# Patient Record
Sex: FEMALE | Race: WHITE | NOT HISPANIC OR LATINO | Employment: OTHER | URBAN - METROPOLITAN AREA
[De-identification: names, ages, dates, MRNs, and addresses within clinical notes are randomized per-mention and may not be internally consistent; named-entity substitution may affect disease eponyms.]

---

## 2022-01-17 ENCOUNTER — APPOINTMENT (OUTPATIENT)
Dept: CARDIOLOGY | Facility: HOSPITAL | Age: 62
End: 2022-01-17

## 2022-01-17 ENCOUNTER — APPOINTMENT (OUTPATIENT)
Dept: CT IMAGING | Facility: HOSPITAL | Age: 62
End: 2022-01-17

## 2022-01-17 ENCOUNTER — HOSPITAL ENCOUNTER (INPATIENT)
Facility: HOSPITAL | Age: 62
LOS: 4 days | Discharge: HOME | End: 2022-01-21
Attending: EMERGENCY MEDICINE | Admitting: INTERNAL MEDICINE

## 2022-01-17 ENCOUNTER — APPOINTMENT (OUTPATIENT)
Dept: GENERAL RADIOLOGY | Facility: HOSPITAL | Age: 62
End: 2022-01-17

## 2022-01-17 DIAGNOSIS — J96.21 ACUTE ON CHRONIC RESPIRATORY FAILURE WITH HYPOXIA AND HYPERCAPNIA: Primary | ICD-10-CM

## 2022-01-17 DIAGNOSIS — A41.9 ACUTE SEPSIS: ICD-10-CM

## 2022-01-17 DIAGNOSIS — J96.22 ACUTE ON CHRONIC RESPIRATORY FAILURE WITH HYPOXIA AND HYPERCAPNIA: Primary | ICD-10-CM

## 2022-01-17 DIAGNOSIS — J44.1 ACUTE EXACERBATION OF CHRONIC OBSTRUCTIVE PULMONARY DISEASE (COPD): ICD-10-CM

## 2022-01-17 DIAGNOSIS — J18.9 PNEUMONIA DUE TO INFECTIOUS ORGANISM, UNSPECIFIED LATERALITY, UNSPECIFIED PART OF LUNG: ICD-10-CM

## 2022-01-17 PROBLEM — Z78.9 ALCOHOL USE: Status: ACTIVE | Noted: 2022-01-17

## 2022-01-17 PROBLEM — R74.8 ELEVATED ALKALINE PHOSPHATASE LEVEL: Status: ACTIVE | Noted: 2022-01-17

## 2022-01-17 PROBLEM — Z72.0 TOBACCO USE: Status: ACTIVE | Noted: 2022-01-17

## 2022-01-17 PROBLEM — F41.8 ANXIETY ASSOCIATED WITH DEPRESSION: Status: ACTIVE | Noted: 2022-01-17

## 2022-01-17 PROBLEM — N17.9 ACUTE KIDNEY INJURY: Status: ACTIVE | Noted: 2022-01-17

## 2022-01-17 PROBLEM — J96.02 ACUTE RESPIRATORY FAILURE WITH HYPOXIA AND HYPERCAPNIA: Status: ACTIVE | Noted: 2022-01-17

## 2022-01-17 PROBLEM — R79.89 ELEVATED SERUM CREATININE: Status: ACTIVE | Noted: 2022-01-17

## 2022-01-17 PROBLEM — J96.00 ACUTE RESPIRATORY FAILURE: Status: ACTIVE | Noted: 2022-01-17

## 2022-01-17 PROBLEM — E87.20 LACTIC ACIDOSIS: Status: ACTIVE | Noted: 2022-01-17

## 2022-01-17 PROBLEM — J96.01 ACUTE RESPIRATORY FAILURE WITH HYPOXIA AND HYPERCAPNIA: Status: ACTIVE | Noted: 2022-01-17

## 2022-01-17 PROBLEM — D72.829 LEUKOCYTOSIS: Status: ACTIVE | Noted: 2022-01-17

## 2022-01-17 PROBLEM — I16.0 HYPERTENSIVE URGENCY: Status: ACTIVE | Noted: 2022-01-17

## 2022-01-17 PROBLEM — E87.6 HYPOKALEMIA: Status: ACTIVE | Noted: 2022-01-17

## 2022-01-17 PROBLEM — R77.8 ELEVATED TROPONIN: Status: ACTIVE | Noted: 2022-01-17

## 2022-01-17 LAB
ALBUMIN SERPL-MCNC: 3.2 G/DL (ref 3.5–5.2)
ALBUMIN/GLOB SERPL: 0.9 G/DL
ALP SERPL-CCNC: 196 U/L (ref 39–117)
ALT SERPL W P-5'-P-CCNC: 19 U/L (ref 1–33)
AMPHET+METHAMPHET UR QL: NEGATIVE
AMPHETAMINES UR QL: NEGATIVE
ANION GAP SERPL CALCULATED.3IONS-SCNC: 15 MMOL/L (ref 5–15)
ARTERIAL PATENCY WRIST A: ABNORMAL
AST SERPL-CCNC: 36 U/L (ref 1–32)
ATMOSPHERIC PRESS: ABNORMAL MM[HG]
BACTERIA UR QL AUTO: ABNORMAL /HPF
BARBITURATES UR QL SCN: NEGATIVE
BASE EXCESS BLDA CALC-SCNC: 7.7 MMOL/L (ref 0–2)
BASOPHILS # BLD AUTO: 0.12 10*3/MM3 (ref 0–0.2)
BASOPHILS NFR BLD AUTO: 1 % (ref 0–1.5)
BDY SITE: ABNORMAL
BENZODIAZ UR QL SCN: NEGATIVE
BILIRUB SERPL-MCNC: 1 MG/DL (ref 0–1.2)
BILIRUB UR QL STRIP: NEGATIVE
BODY TEMPERATURE: 37 C
BUN SERPL-MCNC: 17 MG/DL (ref 8–23)
BUN/CREAT SERPL: 13.6 (ref 7–25)
BUPRENORPHINE SERPL-MCNC: NEGATIVE NG/ML
CALCIUM SPEC-SCNC: 8.5 MG/DL (ref 8.6–10.5)
CANNABINOIDS SERPL QL: NEGATIVE
CHLORIDE SERPL-SCNC: 93 MMOL/L (ref 98–107)
CLARITY UR: CLEAR
CO2 BLDA-SCNC: 36.1 MMOL/L (ref 22–33)
CO2 SERPL-SCNC: 31 MMOL/L (ref 22–29)
COCAINE UR QL: NEGATIVE
COHGB MFR BLD: 5.2 % (ref 0–2)
COLOR UR: YELLOW
CREAT SERPL-MCNC: 1.25 MG/DL (ref 0.57–1)
D-LACTATE SERPL-SCNC: 1.9 MMOL/L (ref 0.5–2)
D-LACTATE SERPL-SCNC: 2.5 MMOL/L (ref 0.5–2)
DEPRECATED RDW RBC AUTO: 77.7 FL (ref 37–54)
EOSINOPHIL # BLD AUTO: 0 10*3/MM3 (ref 0–0.4)
EOSINOPHIL NFR BLD AUTO: 0 % (ref 0.3–6.2)
EPAP: 8
ERYTHROCYTE [DISTWIDTH] IN BLOOD BY AUTOMATED COUNT: 15.6 % (ref 12.3–15.4)
ETHANOL BLD-MCNC: <10 MG/DL (ref 0–10)
FLUAV RNA RESP QL NAA+PROBE: NOT DETECTED
FLUBV RNA RESP QL NAA+PROBE: NOT DETECTED
GFR SERPL CREATININE-BSD FRML MDRD: 43 ML/MIN/1.73
GLOBULIN UR ELPH-MCNC: 3.7 GM/DL
GLUCOSE BLDC GLUCOMTR-MCNC: 169 MG/DL (ref 70–130)
GLUCOSE BLDC GLUCOMTR-MCNC: 174 MG/DL (ref 70–130)
GLUCOSE SERPL-MCNC: 125 MG/DL (ref 65–99)
GLUCOSE UR STRIP-MCNC: NEGATIVE MG/DL
HBA1C MFR BLD: 4.9 % (ref 4.8–5.6)
HCO3 BLDA-SCNC: 34.5 MMOL/L (ref 20–26)
HCT VFR BLD AUTO: 53.5 % (ref 34–46.6)
HCT VFR BLD CALC: 56.5 % (ref 38–51)
HGB BLD-MCNC: 18.6 G/DL (ref 12–15.9)
HGB BLDA-MCNC: 18.4 G/DL (ref 14–18)
HGB UR QL STRIP.AUTO: ABNORMAL
HOLD SPECIMEN: NORMAL
HYALINE CASTS UR QL AUTO: ABNORMAL /LPF
IMM GRANULOCYTES # BLD AUTO: 0.16 10*3/MM3 (ref 0–0.05)
IMM GRANULOCYTES NFR BLD AUTO: 1.3 % (ref 0–0.5)
INHALED O2 CONCENTRATION: 60 %
IPAP: 18
KETONES UR QL STRIP: NEGATIVE
LEUKOCYTE ESTERASE UR QL STRIP.AUTO: NEGATIVE
LYMPHOCYTES # BLD AUTO: 1.02 10*3/MM3 (ref 0.7–3.1)
LYMPHOCYTES NFR BLD AUTO: 8.3 % (ref 19.6–45.3)
MAGNESIUM SERPL-MCNC: 2.2 MG/DL (ref 1.6–2.4)
MCH RBC QN AUTO: 45.4 PG (ref 26.6–33)
MCHC RBC AUTO-ENTMCNC: 34.8 G/DL (ref 31.5–35.7)
MCV RBC AUTO: 130.5 FL (ref 79–97)
METHADONE UR QL SCN: NEGATIVE
METHGB BLD QL: 0.1 % (ref 0–1.5)
MODALITY: ABNORMAL
MONOCYTES # BLD AUTO: 1.08 10*3/MM3 (ref 0.1–0.9)
MONOCYTES NFR BLD AUTO: 8.8 % (ref 5–12)
NEUTROPHILS NFR BLD AUTO: 80.6 % (ref 42.7–76)
NEUTROPHILS NFR BLD AUTO: 9.86 10*3/MM3 (ref 1.7–7)
NITRITE UR QL STRIP: NEGATIVE
NOTE: ABNORMAL
NRBC BLD AUTO-RTO: 0.6 /100 WBC (ref 0–0.2)
NT-PROBNP SERPL-MCNC: ABNORMAL PG/ML (ref 0–900)
OPIATES UR QL: NEGATIVE
OXYCODONE UR QL SCN: NEGATIVE
OXYHGB MFR BLDV: 89.1 % (ref 94–99)
PCO2 BLDA: 53 MM HG (ref 35–45)
PCO2 TEMP ADJ BLD: 53 MM HG (ref 35–45)
PCP UR QL SCN: NEGATIVE
PH BLDA: 7.42 PH UNITS (ref 7.35–7.45)
PH UR STRIP.AUTO: 6.5 [PH] (ref 5–8)
PH, TEMP CORRECTED: 7.42 PH UNITS
PLATELET # BLD AUTO: 335 10*3/MM3 (ref 140–450)
PMV BLD AUTO: 9 FL (ref 6–12)
PO2 BLDA: 68.7 MM HG (ref 83–108)
PO2 TEMP ADJ BLD: 68.7 MM HG (ref 83–108)
POTASSIUM SERPL-SCNC: 3.4 MMOL/L (ref 3.5–5.2)
PROCALCITONIN SERPL-MCNC: 0.43 NG/ML (ref 0–0.25)
PROPOXYPH UR QL: NEGATIVE
PROT SERPL-MCNC: 6.9 G/DL (ref 6–8.5)
PROT UR QL STRIP: ABNORMAL
QT INTERVAL: 380 MS
QTC INTERVAL: 497 MS
RBC # BLD AUTO: 4.1 10*6/MM3 (ref 3.77–5.28)
RBC # UR STRIP: ABNORMAL /HPF
REF LAB TEST METHOD: ABNORMAL
RSV RNA NPH QL NAA+NON-PROBE: NOT DETECTED
SARS-COV-2 RNA RESP QL NAA+PROBE: NOT DETECTED
SET MECH RESP RATE: 10
SODIUM SERPL-SCNC: 139 MMOL/L (ref 136–145)
SP GR UR STRIP: 1.01 (ref 1–1.03)
SQUAMOUS #/AREA URNS HPF: ABNORMAL /HPF
TOTAL RATE: 22 BREATHS/MINUTE
TRICYCLICS UR QL SCN: NEGATIVE
TROPONIN T SERPL-MCNC: 0.03 NG/ML (ref 0–0.03)
TROPONIN T SERPL-MCNC: 0.05 NG/ML (ref 0–0.03)
TROPONIN T SERPL-MCNC: 0.08 NG/ML (ref 0–0.03)
UROBILINOGEN UR QL STRIP: ABNORMAL
VENTILATOR MODE: ABNORMAL
WBC # UR STRIP: ABNORMAL /HPF
WBC NRBC COR # BLD: 12.24 10*3/MM3 (ref 3.4–10.8)
WHOLE BLOOD HOLD SPECIMEN: NORMAL
WHOLE BLOOD HOLD SPECIMEN: NORMAL

## 2022-01-17 PROCEDURE — 94640 AIRWAY INHALATION TREATMENT: CPT

## 2022-01-17 PROCEDURE — 71275 CT ANGIOGRAPHY CHEST: CPT

## 2022-01-17 PROCEDURE — 84484 ASSAY OF TROPONIN QUANT: CPT | Performed by: HOSPITALIST

## 2022-01-17 PROCEDURE — 81001 URINALYSIS AUTO W/SCOPE: CPT | Performed by: NURSE PRACTITIONER

## 2022-01-17 PROCEDURE — 36600 WITHDRAWAL OF ARTERIAL BLOOD: CPT

## 2022-01-17 PROCEDURE — 85025 COMPLETE CBC W/AUTO DIFF WBC: CPT | Performed by: EMERGENCY MEDICINE

## 2022-01-17 PROCEDURE — 94799 UNLISTED PULMONARY SVC/PX: CPT

## 2022-01-17 PROCEDURE — 25010000002 PIPERACILLIN SOD-TAZOBACTAM PER 1 G: Performed by: INTERNAL MEDICINE

## 2022-01-17 PROCEDURE — 25010000002 METHYLPREDNISOLONE PER 40 MG: Performed by: NURSE PRACTITIONER

## 2022-01-17 PROCEDURE — 99223 1ST HOSP IP/OBS HIGH 75: CPT | Performed by: INTERNAL MEDICINE

## 2022-01-17 PROCEDURE — 99285 EMERGENCY DEPT VISIT HI MDM: CPT

## 2022-01-17 PROCEDURE — 84484 ASSAY OF TROPONIN QUANT: CPT | Performed by: NURSE PRACTITIONER

## 2022-01-17 PROCEDURE — 84484 ASSAY OF TROPONIN QUANT: CPT | Performed by: EMERGENCY MEDICINE

## 2022-01-17 PROCEDURE — 83605 ASSAY OF LACTIC ACID: CPT | Performed by: EMERGENCY MEDICINE

## 2022-01-17 PROCEDURE — 87637 SARSCOV2&INF A&B&RSV AMP PRB: CPT | Performed by: EMERGENCY MEDICINE

## 2022-01-17 PROCEDURE — 25010000002 FUROSEMIDE PER 20 MG: Performed by: HOSPITALIST

## 2022-01-17 PROCEDURE — 83050 HGB METHEMOGLOBIN QUAN: CPT

## 2022-01-17 PROCEDURE — 80306 DRUG TEST PRSMV INSTRMNT: CPT | Performed by: NURSE PRACTITIONER

## 2022-01-17 PROCEDURE — 71045 X-RAY EXAM CHEST 1 VIEW: CPT

## 2022-01-17 PROCEDURE — 82962 GLUCOSE BLOOD TEST: CPT

## 2022-01-17 PROCEDURE — 82077 ASSAY SPEC XCP UR&BREATH IA: CPT | Performed by: NURSE PRACTITIONER

## 2022-01-17 PROCEDURE — 25010000002 HEPARIN (PORCINE) PER 1000 UNITS: Performed by: NURSE PRACTITIONER

## 2022-01-17 PROCEDURE — 87040 BLOOD CULTURE FOR BACTERIA: CPT | Performed by: EMERGENCY MEDICINE

## 2022-01-17 PROCEDURE — 25010000002 DEXAMETHASONE SODIUM PHOSPHATE 100 MG/10ML SOLUTION: Performed by: EMERGENCY MEDICINE

## 2022-01-17 PROCEDURE — 94761 N-INVAS EAR/PLS OXIMETRY MLT: CPT

## 2022-01-17 PROCEDURE — 83036 HEMOGLOBIN GLYCOSYLATED A1C: CPT | Performed by: NURSE PRACTITIONER

## 2022-01-17 PROCEDURE — 0 IOPAMIDOL PER 1 ML: Performed by: HOSPITALIST

## 2022-01-17 PROCEDURE — 93306 TTE W/DOPPLER COMPLETE: CPT

## 2022-01-17 PROCEDURE — 94660 CPAP INITIATION&MGMT: CPT

## 2022-01-17 PROCEDURE — 70450 CT HEAD/BRAIN W/O DYE: CPT

## 2022-01-17 PROCEDURE — 83735 ASSAY OF MAGNESIUM: CPT | Performed by: NURSE PRACTITIONER

## 2022-01-17 PROCEDURE — 84145 PROCALCITONIN (PCT): CPT | Performed by: NURSE PRACTITIONER

## 2022-01-17 PROCEDURE — 82375 ASSAY CARBOXYHB QUANT: CPT

## 2022-01-17 PROCEDURE — 93005 ELECTROCARDIOGRAM TRACING: CPT | Performed by: EMERGENCY MEDICINE

## 2022-01-17 PROCEDURE — 83880 ASSAY OF NATRIURETIC PEPTIDE: CPT | Performed by: EMERGENCY MEDICINE

## 2022-01-17 PROCEDURE — 25010000002 AZITHROMYCIN PER 500 MG: Performed by: EMERGENCY MEDICINE

## 2022-01-17 PROCEDURE — 80053 COMPREHEN METABOLIC PANEL: CPT | Performed by: EMERGENCY MEDICINE

## 2022-01-17 PROCEDURE — 82805 BLOOD GASES W/O2 SATURATION: CPT

## 2022-01-17 PROCEDURE — 87636 SARSCOV2 & INF A&B AMP PRB: CPT | Performed by: EMERGENCY MEDICINE

## 2022-01-17 PROCEDURE — 93005 ELECTROCARDIOGRAM TRACING: CPT

## 2022-01-17 RX ORDER — DIPHENOXYLATE HYDROCHLORIDE AND ATROPINE SULFATE 2.5; .025 MG/1; MG/1
1 TABLET ORAL DAILY
Status: COMPLETED | OUTPATIENT
Start: 2022-01-18 | End: 2022-01-20

## 2022-01-17 RX ORDER — ACETAMINOPHEN 325 MG/1
650 TABLET ORAL EVERY 6 HOURS PRN
Status: DISCONTINUED | OUTPATIENT
Start: 2022-01-17 | End: 2022-01-21 | Stop reason: HOSPADM

## 2022-01-17 RX ORDER — AMLODIPINE BESYLATE AND BENAZEPRIL HYDROCHLORIDE 5; 10 MG/1; MG/1
1 CAPSULE ORAL DAILY
COMMUNITY
End: 2022-01-21 | Stop reason: HOSPADM

## 2022-01-17 RX ORDER — LORAZEPAM 1 MG/1
2 TABLET ORAL
Status: DISCONTINUED | OUTPATIENT
Start: 2022-01-17 | End: 2022-01-21 | Stop reason: HOSPADM

## 2022-01-17 RX ORDER — IPRATROPIUM BROMIDE AND ALBUTEROL SULFATE 2.5; .5 MG/3ML; MG/3ML
3 SOLUTION RESPIRATORY (INHALATION)
Status: DISCONTINUED | OUTPATIENT
Start: 2022-01-17 | End: 2022-01-21 | Stop reason: HOSPADM

## 2022-01-17 RX ORDER — IPRATROPIUM BROMIDE AND ALBUTEROL SULFATE 2.5; .5 MG/3ML; MG/3ML
3 SOLUTION RESPIRATORY (INHALATION) EVERY 4 HOURS PRN
Status: DISCONTINUED | OUTPATIENT
Start: 2022-01-17 | End: 2022-01-21 | Stop reason: HOSPADM

## 2022-01-17 RX ORDER — LORAZEPAM 1 MG/1
1 TABLET ORAL
Status: DISCONTINUED | OUTPATIENT
Start: 2022-01-17 | End: 2022-01-21 | Stop reason: HOSPADM

## 2022-01-17 RX ORDER — SODIUM CHLORIDE 0.9 % (FLUSH) 0.9 %
10 SYRINGE (ML) INJECTION AS NEEDED
Status: DISCONTINUED | OUTPATIENT
Start: 2022-01-17 | End: 2022-01-21 | Stop reason: HOSPADM

## 2022-01-17 RX ORDER — LORAZEPAM 2 MG/ML
1 INJECTION INTRAMUSCULAR
Status: DISCONTINUED | OUTPATIENT
Start: 2022-01-17 | End: 2022-01-21 | Stop reason: HOSPADM

## 2022-01-17 RX ORDER — DULOXETIN HYDROCHLORIDE 60 MG/1
60 CAPSULE, DELAYED RELEASE ORAL DAILY
COMMUNITY

## 2022-01-17 RX ORDER — MONTELUKAST SODIUM 10 MG/1
10 TABLET ORAL NIGHTLY
COMMUNITY

## 2022-01-17 RX ORDER — TRIAMTERENE AND HYDROCHLOROTHIAZIDE 37.5; 25 MG/1; MG/1
1 CAPSULE ORAL EVERY MORNING
COMMUNITY

## 2022-01-17 RX ORDER — DULOXETIN HYDROCHLORIDE 60 MG/1
60 CAPSULE, DELAYED RELEASE ORAL DAILY
Status: DISCONTINUED | OUTPATIENT
Start: 2022-01-17 | End: 2022-01-21 | Stop reason: HOSPADM

## 2022-01-17 RX ORDER — LORAZEPAM 2 MG/ML
2 INJECTION INTRAMUSCULAR
Status: DISCONTINUED | OUTPATIENT
Start: 2022-01-17 | End: 2022-01-21 | Stop reason: HOSPADM

## 2022-01-17 RX ORDER — NITROGLYCERIN 20 MG/100ML
10-50 INJECTION INTRAVENOUS
Status: DISCONTINUED | OUTPATIENT
Start: 2022-01-17 | End: 2022-01-17

## 2022-01-17 RX ORDER — POTASSIUM CHLORIDE 750 MG/1
40 CAPSULE, EXTENDED RELEASE ORAL ONCE
Status: COMPLETED | OUTPATIENT
Start: 2022-01-17 | End: 2022-01-17

## 2022-01-17 RX ORDER — FOLIC ACID 1 MG/1
1 TABLET ORAL DAILY
Status: COMPLETED | OUTPATIENT
Start: 2022-01-18 | End: 2022-01-20

## 2022-01-17 RX ORDER — AMLODIPINE BESYLATE 5 MG/1
5 TABLET ORAL
Status: DISCONTINUED | OUTPATIENT
Start: 2022-01-17 | End: 2022-01-18

## 2022-01-17 RX ORDER — FOLIC ACID 1 MG/1
1 TABLET ORAL DAILY
Status: DISCONTINUED | OUTPATIENT
Start: 2022-01-18 | End: 2022-01-17

## 2022-01-17 RX ORDER — METHYLPREDNISOLONE SODIUM SUCCINATE 40 MG/ML
40 INJECTION, POWDER, LYOPHILIZED, FOR SOLUTION INTRAMUSCULAR; INTRAVENOUS EVERY 8 HOURS
Status: DISCONTINUED | OUTPATIENT
Start: 2022-01-17 | End: 2022-01-18

## 2022-01-17 RX ORDER — DIPHENOXYLATE HYDROCHLORIDE AND ATROPINE SULFATE 2.5; .025 MG/1; MG/1
1 TABLET ORAL DAILY
Status: DISCONTINUED | OUTPATIENT
Start: 2022-01-18 | End: 2022-01-17

## 2022-01-17 RX ORDER — MONTELUKAST SODIUM 10 MG/1
10 TABLET ORAL NIGHTLY
Status: DISCONTINUED | OUTPATIENT
Start: 2022-01-17 | End: 2022-01-21 | Stop reason: HOSPADM

## 2022-01-17 RX ORDER — NITROGLYCERIN 20 MG/100ML
5-200 INJECTION INTRAVENOUS
Status: DISCONTINUED | OUTPATIENT
Start: 2022-01-17 | End: 2022-01-19

## 2022-01-17 RX ORDER — SODIUM CHLORIDE 0.9 % (FLUSH) 0.9 %
10 SYRINGE (ML) INJECTION EVERY 12 HOURS SCHEDULED
Status: DISCONTINUED | OUTPATIENT
Start: 2022-01-17 | End: 2022-01-21 | Stop reason: HOSPADM

## 2022-01-17 RX ORDER — METHYLPREDNISOLONE SODIUM SUCCINATE 40 MG/ML
40 INJECTION, POWDER, LYOPHILIZED, FOR SOLUTION INTRAMUSCULAR; INTRAVENOUS EVERY 8 HOURS
Status: DISCONTINUED | OUTPATIENT
Start: 2022-01-17 | End: 2022-01-17

## 2022-01-17 RX ORDER — FUROSEMIDE 10 MG/ML
40 INJECTION INTRAMUSCULAR; INTRAVENOUS ONCE
Status: COMPLETED | OUTPATIENT
Start: 2022-01-17 | End: 2022-01-17

## 2022-01-17 RX ORDER — HEPARIN SODIUM 5000 [USP'U]/ML
5000 INJECTION, SOLUTION INTRAVENOUS; SUBCUTANEOUS EVERY 8 HOURS SCHEDULED
Status: DISCONTINUED | OUTPATIENT
Start: 2022-01-17 | End: 2022-01-21 | Stop reason: HOSPADM

## 2022-01-17 RX ADMIN — NITROGLYCERIN 10 MCG/MIN: 20 INJECTION INTRAVENOUS at 10:50

## 2022-01-17 RX ADMIN — TAZOBACTAM SODIUM AND PIPERACILLIN SODIUM 3.38 G: 375; 3 INJECTION, SOLUTION INTRAVENOUS at 14:06

## 2022-01-17 RX ADMIN — DULOXETINE 60 MG: 60 CAPSULE, DELAYED RELEASE ORAL at 11:28

## 2022-01-17 RX ADMIN — IPRATROPIUM BROMIDE AND ALBUTEROL SULFATE 3 ML: .5; 2.5 SOLUTION RESPIRATORY (INHALATION) at 19:01

## 2022-01-17 RX ADMIN — IOPAMIDOL 75 ML: 755 INJECTION, SOLUTION INTRAVENOUS at 09:16

## 2022-01-17 RX ADMIN — METHYLPREDNISOLONE SODIUM SUCCINATE 40 MG: 40 INJECTION, POWDER, FOR SOLUTION INTRAMUSCULAR; INTRAVENOUS at 10:49

## 2022-01-17 RX ADMIN — METHYLPREDNISOLONE SODIUM SUCCINATE 40 MG: 40 INJECTION, POWDER, FOR SOLUTION INTRAMUSCULAR; INTRAVENOUS at 20:25

## 2022-01-17 RX ADMIN — POTASSIUM CHLORIDE 40 MEQ: 10 CAPSULE, COATED, EXTENDED RELEASE ORAL at 10:49

## 2022-01-17 RX ADMIN — MONTELUKAST SODIUM 10 MG: 10 TABLET, COATED ORAL at 20:25

## 2022-01-17 RX ADMIN — DOXYCYCLINE 100 MG: 100 INJECTION, POWDER, LYOPHILIZED, FOR SOLUTION INTRAVENOUS at 12:18

## 2022-01-17 RX ADMIN — NITROGLYCERIN 90 MCG/MIN: 20 INJECTION INTRAVENOUS at 23:54

## 2022-01-17 RX ADMIN — FUROSEMIDE 40 MG: 10 INJECTION, SOLUTION INTRAMUSCULAR; INTRAVENOUS at 08:27

## 2022-01-17 RX ADMIN — BREXPIPRAZOLE 1 MG: 1 TABLET ORAL at 11:29

## 2022-01-17 RX ADMIN — IPRATROPIUM BROMIDE AND ALBUTEROL SULFATE 3 ML: .5; 2.5 SOLUTION RESPIRATORY (INHALATION) at 15:57

## 2022-01-17 RX ADMIN — THIAMINE HCL TAB 100 MG 100 MG: 100 TAB at 10:55

## 2022-01-17 RX ADMIN — SODIUM CHLORIDE, PRESERVATIVE FREE 10 ML: 5 INJECTION INTRAVENOUS at 10:55

## 2022-01-17 RX ADMIN — SODIUM CHLORIDE, PRESERVATIVE FREE 10 ML: 5 INJECTION INTRAVENOUS at 20:25

## 2022-01-17 RX ADMIN — DEXAMETHASONE SODIUM PHOSPHATE 10 MG: 10 INJECTION, SOLUTION INTRAMUSCULAR; INTRAVENOUS at 05:08

## 2022-01-17 RX ADMIN — TAZOBACTAM SODIUM AND PIPERACILLIN SODIUM 3.38 G: 375; 3 INJECTION, SOLUTION INTRAVENOUS at 21:11

## 2022-01-17 RX ADMIN — AZITHROMYCIN 500 MG: 500 INJECTION, POWDER, LYOPHILIZED, FOR SOLUTION INTRAVENOUS at 05:34

## 2022-01-17 RX ADMIN — AMLODIPINE BESYLATE 5 MG: 5 TABLET ORAL at 08:29

## 2022-01-17 RX ADMIN — TAZOBACTAM SODIUM AND PIPERACILLIN SODIUM 4.5 G: 500; 4 INJECTION, SOLUTION INTRAVENOUS at 08:10

## 2022-01-17 RX ADMIN — IPRATROPIUM BROMIDE AND ALBUTEROL SULFATE 3 ML: .5; 2.5 SOLUTION RESPIRATORY (INHALATION) at 07:13

## 2022-01-17 RX ADMIN — NITROGLYCERIN 50 MCG/MIN: 20 INJECTION INTRAVENOUS at 04:41

## 2022-01-17 RX ADMIN — HEPARIN SODIUM 5000 UNITS: 5000 INJECTION, SOLUTION INTRAVENOUS; SUBCUTANEOUS at 14:06

## 2022-01-17 RX ADMIN — DOXYCYCLINE 100 MG: 100 INJECTION, POWDER, LYOPHILIZED, FOR SOLUTION INTRAVENOUS at 23:55

## 2022-01-17 RX ADMIN — HEPARIN SODIUM 5000 UNITS: 5000 INJECTION, SOLUTION INTRAVENOUS; SUBCUTANEOUS at 20:25

## 2022-01-17 RX ADMIN — ACETAMINOPHEN 650 MG: 325 TABLET, FILM COATED ORAL at 22:20

## 2022-01-17 NOTE — PROGRESS NOTES
[facility]  ADMISSION FOLLOW-UP NOTE          Patient admitted after midnight, H&P by my partner performed earlier on today's date reviewed.  Interim findings, labs, and charting also reviewed.        The [facility] Hospital Problem List has been managed and updated to include any new diagnoses:  Active Hospital Problems    Diagnosis  POA   • **COPD with acute exacerbation (HCC) [J44.1]  Yes   • Lactic acidosis [E87.2]  Yes   • Elevated troponin [R77.8]  Yes   • Hypokalemia [E87.6]  Yes   • Elevated serum creatinine [R79.89]  Yes   • Leukocytosis [D72.829]  Yes   • Hypertensive urgency [I16.0]  Yes   • Acute respiratory failure (HCC) [J96.00]  Yes   • Alcohol use [Z72.89]  Yes   • Tobacco use [Z72.0]  Yes   • Anxiety associated with depression [F41.8]  Yes   • Acute respiratory failure with hypoxia and hypercapnia (HCC) [J96.01, J96.02]  Yes   • Sepsis (HCC) [A41.9]  Yes      Resolved Hospital Problems   No resolved problems to display.     62 yo F with hx of COPD, ongoing tobacco abuse, HTN, and ETOH use who presents due to respiratory distress. EMS found initial O2 sats to be 50% and patient was very somnolent. Brought to the ER and placed on BiPAP. /112 on arrival. Labs showed troponin 0.032, proBNP 12,613, K 3.4, Cr 1.25, lactic acid 2.5, procal 0.43, WBC 12.24. ABG was not collected until after she had been on BiPAP for a few hours--pH 7.42, pCO2 52. CXR showed mild cardiomegaly only. COVID/Flu/RSV PCR negative.    Acute hypoxic/hypercarbic respiratory failure  COPD exacerbation  Possible acute diastolic CHF  Sepsis, POA due to possible aspiration? (tachycardia, leukocytosis, lactic acidosis, elevated procal)  --CXR no acute findings. CTA chest with moderate to severe emphysema, dependent bibasilar atelectasis, no evidence of PE.  --Weaned off BiPAP to 6 liters. Continue to wean as tolerated. Does not wear home oxygen, but says she has been told in the past that  "she needs it.  --Scheduled nebs, IV Solumedrol.  --Continue Zosyn/Doxycline for now. F/U blood cultures. UA negative.  --proBNP significantly elevated--will check Echo. Give IV Lasix 40 mg x 1. Defer fluids at this time.     Altered mental status, resolved  --Likely hypoxic/hypercarbic encephalopathy.  --CT head no acute findings.    Elevated troponin  --EKG with sinus tachycardia and some T wave inversions.  --Will trend. Likely demand ischemia in setting of severe hypoxia.    ETOH use  --Has 6 drinks daily, usually vodka + V8 juice.   --CIWA protocol and PRN Ativan.     Elevated Cr  --No labs for comparison.  --Watch after diuresis.    Hypertensive urgency  --On Amlodipine-Benazepril and Triamterene-HCTZ at home: resume Amlodipine only for now given elevated Cr.  --Nitro drip for now. Wean as tolerated.     Mood disorder  --Continue Cymbalta.    Ongoing tobacco abuse  --Patient \"smokes way too much\" and is embarrassed to tell me how much.   --Has allergy to nicotine patch.  --Counseled on tobacco cessation--she knows she needs to quit and is interested in starting treatment. Will offer resources closer to discharge.         Marilyn Mcdaniel MD  01/17/22      "

## 2022-01-17 NOTE — H&P
[facility] Medicine Services  HISTORY AND PHYSICAL    Patient Name: Arlene Pandey  : 1960  MRN: [id number]  Primary Care Physician: Poncho Garcia MD  Date of admission: 2022      Subjective   Subjective     Chief Complaint:  sob/history obtained from ER chart/ on phone    HPI:  Arlene Pandey is a 61 y.o. female  to ED secondary to respiratory distress.  As per  patient not feeling well over the past 24 hours, slept throughout the afternoon, around 3 PM has been noted wife struggling to breathe, called EMS, patient was found to be oxygen sats of 50% on initial evaluation by EMS, and very somnolent.  Patient was placed on BiPAP- during the ride, patient was more awake on initial ED evaluation.  As per  patient has chronic cough not much worse from the baseline, also chronic wheezing, supposed to use oxygen but never received it.  Still active smoker.  No complaint of fever or chills as per .  Has been doing well until 24 hours ago.  Has been fully vaccinated for COVID.  Patient is on BiPAP able to give limited history but denies any complaint of chest pain, abdominal pain, headaches.  Patient systolic blood pressure was found to be in 180s.  No complaint of pedal edema.  Review of system is limited-since patient is on BiPAP.        Current COVID Risks are:  [] Fever []  Cough [] Shortness of breath [] Fatigue [] Change in taste or smell    [] Exposure to COVID positive patient  [] High risk facility   []  NONE  COVID VACCINE status   -as per  is fully vaccinated including the booster dose.        Review of Systems   Complete ROS done, which is negative except as above and HPI.  Old records reviewed and summarized in PM hx      Personal History     Past Medical History:   Diagnosis Date   • Chronic respiratory failure (HCC)    • COPD (chronic obstructive pulmonary disease) (HCC)    COPD  Active smoker  HTN  Daily alcohol use  Mood  disorder       past surgical history   - is unaware about any major surgeries.    Family History:   - Multiple family member with history of hypertension..     Social History:    -Active smoker smokes multiple packs a day.  - Drinks white liquor 8 to 10 ounces every day, no known history of alcohol withdrawal or seizure.    Medications:  Available home medication information reviewed.  (Not in a hospital admission)      Allergies   Allergen Reactions   • Penicillins Rash       Objective   Objective     Vital Signs:   Temp:  [97.7 °F (36.5 °C)] 97.7 °F (36.5 °C)  Heart Rate:  [102-104] 102  Resp:  [19] 19-saturating 91% on 60% BiPAP  BP: (145-180)/() 152/119        Physical Exam     GENERAL-well nourished, uncomfortable due to BiPAP  RS-coarse expiratory crackles anteriorly with prolonged expiration,  CVS- s1s2 Regular, tachycardic, no murmur.  ABD- soft, nontender, not distended, no organomegaly.  EXT- no edema, skin appears dry and erythematous, nontender,.  NEURO-patient currently on BiPAP, is alert awake, follows verbal command EYES- Conjunctivae are normal. Pupils are equal, round, and reactive to light. No scleral icterus.   ENT- no external ear nose lesions, NECK- No JVD present. No tracheal deviation present. No thyromegaly present,No cervical lymphadenopathy.  JOINTS/MSK- no deformity, no swelling.  SKIN- no rash , warm to touch.  PSYCHIATRIC-anxious.    Results Reviewed:  I have personally reviewed current lab and radiology data.    Results from last 7 days   Lab Units 01/17/22  0441   WBC 10*3/mm3 12.24*   HEMOGLOBIN g/dL 18.6*   HEMATOCRIT % 53.5*   PLATELETS 10*3/mm3 335     Results from last 7 days   Lab Units 01/17/22  0441 01/17/22  0440   SODIUM mmol/L 139  --    POTASSIUM mmol/L 3.4*  --    CHLORIDE mmol/L 93*  --    CO2 mmol/L 31.0*  --    BUN mg/dL 17  --    CREATININE mg/dL 1.25*  --    GLUCOSE mg/dL 125*  --    CALCIUM mg/dL 8.5*  --    ALT (SGPT) U/L 19  --    AST (SGOT) U/L 36*   --    TROPONIN T ng/mL 0.032*  --    PROBNP pg/mL 12,613.0*  --    LACTATE mmol/L  --  2.5*     Estimated Creatinine Clearance: 46.9 mL/min (A) (by C-G formula based on SCr of 1.25 mg/dL (H)).  Brief Urine Lab Results     None        Imaging Results (Last 24 Hours)     Procedure Component Value Units Date/Time    XR Chest 1 View [[id number]] Collected: 01/17/22 0508     Updated: 01/17/22 0511    Narrative:      CR Chest 1 Vw    INDICATION:   Shortness of air.     COMPARISON:    None available.    FINDINGS:  Portable AP view(s) of the chest.  Mild cardiomegaly. Mediastinal contours are normal. The lungs are clear. No pneumothorax or pleural effusion.      Impression:      Mild cardiomegaly. No other acute chest findings.    Signer Name: Cristino Khan MD   Signed: 1/17/2022 5:08 AM   Workstation Name: [email or web address]    [facility]                Assessment/Plan   Active Hospital Problems    Diagnosis  POA   • COPD with acute exacerbation (HCC) [J44.1]  Yes   • Acute on chronic respiratory failure with hypoxia and hypercapnia (HCC) [J96.21, J96.22]  Yes   • Lactic acidosis [E87.2]  Yes   • Elevated troponin [R77.8]  Yes   • Hypokalemia [E87.6]  Yes   • Acute kidney injury (HCC) [N17.9]  Yes   • Elevated alkaline phosphatase level [R74.8]  Yes   • Leukocytosis [D72.829]  Yes   • Hypertensive urgency [I16.0]  Yes       Assessment & Plan     Altered mental status-metabolic encephalopathy secondary to hypercarbia,?  Alcohol induced.  Hypoxic/with chest x-ray-positive for mild cardiomegaly- possibility of aspiration.  - Continue BiPAP, repeat blood gas in an hour  - Zosyn/doxycycline  - DuoNebs, low-dose steroids    Hypertensive urgency-started on nitro drip we we will taper with administration of home meds.    Elevated troponin with proBNP-bedside ultrasound by ED attending, did not show pulmonary edema/on low EF   -We will get echo in a.m.  - EKG- sinus tach 103 bpm, right atrial enlargement, left  anterior fascicular block,  T wave inversion in 1 and aVL.    Elevated creatinine-baseline not known, no known function of renal dysfunction,-we will continue to monitor, IV fluids not started due to some worry of CHF component.    COPD-as above    Active smoker-as per  patient is allergic to nicotine gum/patch which gives her blisters.    HTN-at home is on Norvasc 5 mg, triamterene/HCTZ 37.5-25 mg in a.m.    Daily alcohol use- we will continue to monitor for any withdrawal    Mood disorder-Cymbalta 60 mg daily    DVT prophylaxis:    -TEDs/SCDs  -Lovenox    CODE STATUS:    Code Status and Medical Interventions:   Ordered at: 01/17/22 0555     Code Status (Patient has no pulse and is not breathing):    CPR (Attempt to Resuscitate)     Medical Interventions (Patient has pulse or is breathing):    Full Support         Admission Status:  I believe this patient meets inpatient criteria secondary to hypoxic respiratory failure, needing further management and diagnostic work-up.

## 2022-01-17 NOTE — ED PROVIDER NOTES
[place]    EMERGENCY DEPARTMENT ENCOUNTER      Pt Name: Arlene Pandey  MRN: [id number]  YOB: 1960  Date of evaluation: 1/17/2022  Provider: Alessandro Horn MD    CHIEF COMPLAINT       Chief Complaint   Patient presents with   • Shortness of Breath         HISTORY OF PRESENT ILLNESS  (Location/Symptom, Timing/Onset, Context/Setting, Quality, Duration, Modifying Factors, Severity.)   Arlene Pandey is a 61 y.o. female who presents to the emergency department with altered mental status and shortness of breath.  Per report from EMS, they were called because the patient's  said that she was very somnolent after going down for a nap around 2 PM yesterday afternoon and was having difficulty breathing.  They stated that on their arrival, the patient was very somnolent and saturating in the mid 50s on room air.  They applied CPAP and gave nebulizer treatments in route to the hospital.  They also noted that her blood pressure is markedly elevated and so they administered 2 doses of sublingual nitroglycerin.  She had rapid improvement in her mental status with these interventions and she is awake and alert on arrival.  She is not complaining about any chest pain.  She does state that she has had some dry cough over the course of the past couple of days but no fever.  She has a history of COPD and is supposed to wear oxygen at home but admits that she does not wear it.  She denies any cardiac history.      Nursing notes were reviewed.    REVIEW OF SYSTEMS    (2-9 systems for level 4, 10 or more for level 5)   ROS:  General:  No fevers, no chills, no weakness  Cardiovascular:  No chest pain, no palpitations  Respiratory: Shortness of breath, cough  Gastrointestinal:  No pain, no nausea, no vomiting, no diarrhea  Musculoskeletal:  No muscle pain, no joint pain  Skin:  No rash  Neurologic:  No speech problems, no headache, no extremity numbness, no extremity tingling, no extremity weakness  Psychiatric:  No  "anxiety  Genitourinary:  No dysuria, no hematuria    Except as noted above the remainder of the review of systems was reviewed and negative.       PAST MEDICAL HISTORY     Past Medical History:   Diagnosis Date   • Chronic respiratory failure (HCC)    • COPD (chronic obstructive pulmonary disease) (HCC)          SURGICAL HISTORY     No past surgical history on file.      CURRENT MEDICATIONS       Current Facility-Administered Medications:   •  AZITHROMYCIN 500 MG/250 ML 0.9% NS IVPB (vial-mate), 500 mg, Intravenous, Once, Alessandro Horn MD, 500 mg at 01/17/22 0534  •  cefTRIAXone (ROCEPHIN) 1 g/100 mL 0.9% NS (MBP), 1 g, Intravenous, Once, Alessandro Horn MD  •  nitroglycerin (TRIDIL) 200 mcg/ml infusion, 10-50 mcg/min, Intravenous, Titrated, Alessandro Horn MD, Stopped at 01/17/22 0505  •  sodium chloride 0.9 % flush 10 mL, 10 mL, Intravenous, PRN, Alessandro Horn MD  No current outpatient medications on file.    ALLERGIES     Penicillins    FAMILY HISTORY     No family history on file.       SOCIAL HISTORY       Social History     Socioeconomic History   • Marital status:          PHYSICAL EXAM    (up to 7 for level 4, 8 or more for level 5)     Vitals:    01/17/22 0450 01/17/22 0455 01/17/22 0500 01/17/22 0530   BP:  149/85 145/85 152/90   Pulse: 102      Resp: 19      Temp: 97.7 °F (36.5 °C)      TempSrc: Axillary      SpO2: 94% 93% 93% 91%   Weight: 90.7 kg (200 lb)      Height: 152.4 cm (60\")          Physical Exam  General: Awake, alert, severe distress, ill-appearing  HEENT: Conjunctivae normal.  Neck: Trachea midline.  Cardiac: Tachycardic, regular rhythm, no murmurs, rubs, or gallops  Lungs: Diffusely coarse with diffuse expiratory wheezes, no focal findings  Chest wall: There is no tenderness to palpation over the chest wall or over ribs  Abdomen: Abdomen is soft, nontender, nondistended. There are no firm or pulsatile masses, no rebound rigidity or guarding.   Musculoskeletal: No " deformity.  Neuro: Alert and oriented x 4.  Dermatology: Skin is warm and dry  Psych: Mentation is grossly normal, cognition is grossly normal. Affect is appropriate.        DIAGNOSTIC RESULTS     EKG: All EKGs are interpreted by the Emergency Department Physician who either signs or Co-signs this chart in the absence of a cardiologist.    ECG 12 Lead   Final Result   Test Reason : SHORTNESS OF BREATH   Blood Pressure :   */*   mmHG   Vent. Rate : 103 BPM     Atrial Rate : 103 BPM      P-R Int : 142 ms          QRS Dur :  96 ms       QT Int : 380 ms       P-R-T Axes :  75 -51 111 degrees      QTc Int : 497 ms      Sinus tachycardia with occasional premature ventricular complexes   Right atrial enlargement   Pulmonary disease pattern   Left anterior fascicular block   Abnormal ECG   No previous ECGs available   Confirmed by BRANDAN BLACKMON ([id number]) on 1/17/2022 5:55:18 AM      Referred By: EDMD           Confirmed By: BRANDAN BLACKMON          RADIOLOGY:   Non-plain film images such as CT, Ultrasound and MRI are read by the radiologist. Plain radiographic images are visualized and preliminarily interpreted by the emergency physician with the below findings:      [x] Radiologist's Report Reviewed:  XR Chest 1 View   Final Result   Mild cardiomegaly. No other acute chest findings.      Signer Name: Cristino Khan MD    Signed: 1/17/2022 5:08 AM    Workstation Name: [email or web address]     [facility]            ED BEDSIDE ULTRASOUND:   Performed by ED Physician - none    LABS:    I have reviewed and interpreted all of the currently available lab results from this visit (if applicable):  Results for orders placed or performed during the hospital encounter of 01/17/22   Comprehensive Metabolic Panel    Specimen: Blood   Result Value Ref Range    Glucose 125 (H) 65 - 99 mg/dL    BUN 17 8 - 23 mg/dL    Creatinine 1.25 (H) 0.57 - 1.00 mg/dL    Sodium 139 136 - 145 mmol/L    Potassium 3.4 (L) 3.5 - 5.2 mmol/L     Chloride 93 (L) 98 - 107 mmol/L    CO2 31.0 (H) 22.0 - 29.0 mmol/L    Calcium 8.5 (L) 8.6 - 10.5 mg/dL    Total Protein 6.9 6.0 - 8.5 g/dL    Albumin 3.20 (L) 3.50 - 5.20 g/dL    ALT (SGPT) 19 1 - 33 U/L    AST (SGOT) 36 (H) 1 - 32 U/L    Alkaline Phosphatase 196 (H) 39 - 117 U/L    Total Bilirubin 1.0 0.0 - 1.2 mg/dL    eGFR Non African Amer 43 (L) >60 mL/min/1.73    Globulin 3.7 gm/dL    A/G Ratio 0.9 g/dL    BUN/Creatinine Ratio 13.6 7.0 - 25.0    Anion Gap 15.0 5.0 - 15.0 mmol/L   BNP    Specimen: Blood   Result Value Ref Range    proBNP 12,613.0 (H) 0.0 - 900.0 pg/mL   Troponin    Specimen: Blood   Result Value Ref Range    Troponin T 0.032 (C) 0.000 - 0.030 ng/mL   CBC Auto Differential    Specimen: Blood   Result Value Ref Range    WBC 12.24 (H) 3.40 - 10.80 10*3/mm3    RBC 4.10 3.77 - 5.28 10*6/mm3    Hemoglobin 18.6 (H) 12.0 - 15.9 g/dL    Hematocrit 53.5 (H) 34.0 - 46.6 %    .5 (H) 79.0 - 97.0 fL    MCH 45.4 (H) 26.6 - 33.0 pg    MCHC 34.8 31.5 - 35.7 g/dL    RDW 15.6 (H) 12.3 - 15.4 %    RDW-SD 77.7 (H) 37.0 - 54.0 fl    MPV 9.0 6.0 - 12.0 fL    Platelets 335 140 - 450 10*3/mm3    Neutrophil % 80.6 (H) 42.7 - 76.0 %    Lymphocyte % 8.3 (L) 19.6 - 45.3 %    Monocyte % 8.8 5.0 - 12.0 %    Eosinophil % 0.0 (L) 0.3 - 6.2 %    Basophil % 1.0 0.0 - 1.5 %    Immature Grans % 1.3 (H) 0.0 - 0.5 %    Neutrophils, Absolute 9.86 (H) 1.70 - 7.00 10*3/mm3    Lymphocytes, Absolute 1.02 0.70 - 3.10 10*3/mm3    Monocytes, Absolute 1.08 (H) 0.10 - 0.90 10*3/mm3    Eosinophils, Absolute 0.00 0.00 - 0.40 10*3/mm3    Basophils, Absolute 0.12 0.00 - 0.20 10*3/mm3    Immature Grans, Absolute 0.16 (H) 0.00 - 0.05 10*3/mm3    nRBC 0.6 (H) 0.0 - 0.2 /100 WBC   Lactic Acid, Plasma    Specimen: Blood   Result Value Ref Range    Lactate 2.5 (C) 0.5 - 2.0 mmol/L   ECG 12 Lead   Result Value Ref Range    QT Interval 380 ms    QTC Interval 497 ms   Green Top (Gel)   Result Value Ref Range    Extra Tube Hold for add-ons.   "  Lavender Top   Result Value Ref Range    Extra Tube hold for add-on    Gold Top - SST   Result Value Ref Range    Extra Tube Hold for add-ons.    Light Blue Top   Result Value Ref Range    Extra Tube hold for add-on         All other labs were within normal range or not returned as of this dictation.      EMERGENCY DEPARTMENT COURSE and DIFFERENTIAL DIAGNOSIS/MDM:   Vitals:    Vitals:    01/17/22 0450 01/17/22 0455 01/17/22 0500 01/17/22 0530   BP:  149/85 145/85 152/90   Pulse: 102      Resp: 19      Temp: 97.7 °F (36.5 °C)      TempSrc: Axillary      SpO2: 94% 93% 93% 91%   Weight: 90.7 kg (200 lb)      Height: 152.4 cm (60\")          ED Course as of 01/17/22 0557   Mon Jan 17, 2022   0546 Spoke w/  who accepts the pt for admission. [NS]      ED Course User Index  [NS] Alessandro Horn MD       Patient presents with findings concerning for acute mixed hypoxic and hypercapnic respiratory failure secondary to severe COPD exacerbation.  Also concern for underlying pneumonia with patchy infiltrates on my review of the x-ray on the right side.  She was placed on BiPAP on arrival and also given IV Decadron.  Nebulizer treatments were withheld until COVID status was obtained which is pending at the time of admission.  Also administered appropriate antibiotics.  There was some initial concern about underlying flash pulmonary edema, however bedside ultrasound revealed normal cardiac contractility and no evidence of pulmonary edema and so nitroglycerin drip which was initially started was subsequently withheld.  She complains of no chest pain, ECG demonstrates no acute ischemic changes.  Initial troponin only minimally elevated at 0.03 and do not feel his presentation is consistent with ACS.  She is being admitted to the hospitalist service for further evaluation.          MEDICATIONS ADMINISTERED IN ED:  Medications   nitroglycerin (TRIDIL) 200 mcg/ml infusion (0 mcg/min Intravenous Stopped 1/17/22 0505) "   sodium chloride 0.9 % flush 10 mL (has no administration in time range)   cefTRIAXone (ROCEPHIN) 1 g/100 mL 0.9% NS (MBP) (has no administration in time range)   AZITHROMYCIN 500 MG/250 ML 0.9% NS IVPB (vial-mate) (500 mg Intravenous New Bag 1/17/22 1866)   dexamethasone (DECADRON) IVPB 10 mg (0 mg Intravenous Stopped 1/17/22 7760)         CRITICAL CARE TIME    Approximately 65 minutes of discontinuous critical care time was provided to this patient by myself absent of any time spent performing procedures.  Patient presents critically ill with with acute mixed hypoxic and hypercapnic respiratory failure secondary to acute exacerbation of COPD with underlying pneumonia placing the respiratory, cardiovascular, neurologic systems at risk requiring the following interventions: Initiation of BiPAP therapy, provision of IV antibiotics, IV steroids, interpretation of lab/ECG/imaging, frequent reassessment, coordination of admission with the following response: Resolution of hypoxia and hypercapnia.  Patient at high risk of deterioration and possibly death without these interventions.        FINAL IMPRESSION      1. Acute on chronic respiratory failure with hypoxia and hypercapnia (HCC)    2. Acute exacerbation of chronic obstructive pulmonary disease (COPD) (HCC)    3. Acute sepsis (HCC)    4. Pneumonia due to infectious organism, unspecified laterality, unspecified part of lung          DISPOSITION/PLAN     ED Disposition     ED Disposition Condition Comment    Decision to Admit              Alessandro Horn MD  Attending Emergency Physician               Alessandro Horn MD  01/17/22 7365

## 2022-01-18 LAB
ALBUMIN SERPL-MCNC: 2.7 G/DL (ref 3.5–5.2)
ALBUMIN/GLOB SERPL: 0.9 G/DL
ALP SERPL-CCNC: 116 U/L (ref 39–117)
ALT SERPL W P-5'-P-CCNC: 16 U/L (ref 1–33)
ANION GAP SERPL CALCULATED.3IONS-SCNC: 10 MMOL/L (ref 5–15)
AST SERPL-CCNC: 24 U/L (ref 1–32)
BASOPHILS # BLD AUTO: 0.06 10*3/MM3 (ref 0–0.2)
BASOPHILS NFR BLD AUTO: 0.4 % (ref 0–1.5)
BH CV ECHO MEAS - AO ROOT AREA (BSA CORRECTED): 1.6
BH CV ECHO MEAS - AO ROOT AREA: 7.3 CM^2
BH CV ECHO MEAS - AO ROOT DIAM: 3 CM
BH CV ECHO MEAS - BSA(HAYCOCK): 2 M^2
BH CV ECHO MEAS - BSA: 1.9 M^2
BH CV ECHO MEAS - BZI_BMI: 39.1 KILOGRAMS/M^2
BH CV ECHO MEAS - BZI_METRIC_HEIGHT: 152.4 CM
BH CV ECHO MEAS - BZI_METRIC_WEIGHT: 90.7 KG
BH CV ECHO MEAS - EDV(CUBED): 44.5 ML
BH CV ECHO MEAS - EDV(MOD-SP2): 223 ML
BH CV ECHO MEAS - EDV(MOD-SP4): 178 ML
BH CV ECHO MEAS - EDV(TEICH): 52.4 ML
BH CV ECHO MEAS - EF(CUBED): 62.1 %
BH CV ECHO MEAS - EF(MOD-BP): 54 %
BH CV ECHO MEAS - EF(MOD-SP2): 61.4 %
BH CV ECHO MEAS - EF(MOD-SP4): 43.3 %
BH CV ECHO MEAS - EF(TEICH): 54.7 %
BH CV ECHO MEAS - ESV(CUBED): 16.8 ML
BH CV ECHO MEAS - ESV(MOD-SP2): 86 ML
BH CV ECHO MEAS - ESV(MOD-SP4): 101 ML
BH CV ECHO MEAS - ESV(TEICH): 23.8 ML
BH CV ECHO MEAS - FS: 27.7 %
BH CV ECHO MEAS - IVS/LVPW: 0.96
BH CV ECHO MEAS - IVSD: 1.4 CM
BH CV ECHO MEAS - LA DIMENSION: 4.2 CM
BH CV ECHO MEAS - LA/AO: 1.4
BH CV ECHO MEAS - LAD MAJOR: 5.2 CM
BH CV ECHO MEAS - LAT PEAK E' VEL: 5.6 CM/SEC
BH CV ECHO MEAS - LATERAL E/E' RATIO: 13.2
BH CV ECHO MEAS - LV DIASTOLIC VOL/BSA (35-75): 95.4 ML/M^2
BH CV ECHO MEAS - LV MASS(C)D: 185.4 GRAMS
BH CV ECHO MEAS - LV MASS(C)DI: 99.3 GRAMS/M^2
BH CV ECHO MEAS - LV MAX PG: 7.7 MMHG
BH CV ECHO MEAS - LV MEAN PG: 3.3 MMHG
BH CV ECHO MEAS - LV SYSTOLIC VOL/BSA (12-30): 54.1 ML/M^2
BH CV ECHO MEAS - LV V1 MAX: 139.2 CM/SEC
BH CV ECHO MEAS - LV V1 MEAN: 78.1 CM/SEC
BH CV ECHO MEAS - LV V1 VTI: 29.7 CM
BH CV ECHO MEAS - LVIDD: 3.5 CM
BH CV ECHO MEAS - LVIDS: 2.6 CM
BH CV ECHO MEAS - LVLD AP2: 9.6 CM
BH CV ECHO MEAS - LVLD AP4: 9.4 CM
BH CV ECHO MEAS - LVLS AP2: 8.4 CM
BH CV ECHO MEAS - LVLS AP4: 8.6 CM
BH CV ECHO MEAS - LVOT AREA (M): 2 CM^2
BH CV ECHO MEAS - LVOT AREA: 1.9 CM^2
BH CV ECHO MEAS - LVOT DIAM: 1.6 CM
BH CV ECHO MEAS - LVPWD: 1.5 CM
BH CV ECHO MEAS - MED PEAK E' VEL: 4.4 CM/SEC
BH CV ECHO MEAS - MEDIAL E/E' RATIO: 16.7
BH CV ECHO MEAS - MV A MAX VEL: 126.9 CM/SEC
BH CV ECHO MEAS - MV DEC SLOPE: 457.3 CM/SEC^2
BH CV ECHO MEAS - MV DEC TIME: 0.13 SEC
BH CV ECHO MEAS - MV E MAX VEL: 75.5 CM/SEC
BH CV ECHO MEAS - MV E/A: 0.6
BH CV ECHO MEAS - MV MAX PG: 9 MMHG
BH CV ECHO MEAS - MV MEAN PG: 4.4 MMHG
BH CV ECHO MEAS - MV P1/2T MAX VEL: 106.4 CM/SEC
BH CV ECHO MEAS - MV P1/2T: 68.2 MSEC
BH CV ECHO MEAS - MV V2 MAX: 149.9 CM/SEC
BH CV ECHO MEAS - MV V2 MEAN: 98.4 CM/SEC
BH CV ECHO MEAS - MV V2 VTI: 29.8 CM
BH CV ECHO MEAS - MVA P1/2T LCG: 2.1 CM^2
BH CV ECHO MEAS - MVA(P1/2T): 3.2 CM^2
BH CV ECHO MEAS - MVA(VTI): 1.9 CM^2
BH CV ECHO MEAS - PA ACC SLOPE: 436.8 CM/SEC^2
BH CV ECHO MEAS - PA ACC TIME: 0.15 SEC
BH CV ECHO MEAS - PA PR(ACCEL): 10.9 MMHG
BH CV ECHO MEAS - PI END-D VEL: 124.9 CM/SEC
BH CV ECHO MEAS - RAP SYSTOLE: 3 MMHG
BH CV ECHO MEAS - RVSP: 44 MMHG
BH CV ECHO MEAS - SI(CUBED): 14.8 ML/M^2
BH CV ECHO MEAS - SI(LVOT): 30.2 ML/M^2
BH CV ECHO MEAS - SI(MOD-SP2): 73.4 ML/M^2
BH CV ECHO MEAS - SI(MOD-SP4): 41.3 ML/M^2
BH CV ECHO MEAS - SI(TEICH): 15.3 ML/M^2
BH CV ECHO MEAS - SV(CUBED): 27.6 ML
BH CV ECHO MEAS - SV(LVOT): 56.4 ML
BH CV ECHO MEAS - SV(MOD-SP2): 137 ML
BH CV ECHO MEAS - SV(MOD-SP4): 77 ML
BH CV ECHO MEAS - SV(TEICH): 28.6 ML
BH CV ECHO MEAS - TAPSE (>1.6): 1.3 CM
BH CV ECHO MEAS - TR MAX PG: 41 MMHG
BH CV ECHO MEAS - TR MAX VEL: 319 CM/SEC
BH CV ECHO MEASUREMENTS AVERAGE E/E' RATIO: 15.1
BH CV VAS BP LEFT ARM: NORMAL MMHG
BH CV XLRA - RV BASE: 4.3 CM
BH CV XLRA - RV LENGTH: 6.5 CM
BH CV XLRA - RV MID: 3.7 CM
BH CV XLRA - TDI S': 11 CM/SEC
BILIRUB SERPL-MCNC: 0.8 MG/DL (ref 0–1.2)
BUN SERPL-MCNC: 18 MG/DL (ref 8–23)
BUN/CREAT SERPL: 24.7 (ref 7–25)
CALCIUM SPEC-SCNC: 8.1 MG/DL (ref 8.6–10.5)
CHLORIDE SERPL-SCNC: 91 MMOL/L (ref 98–107)
CO2 SERPL-SCNC: 37 MMOL/L (ref 22–29)
CREAT SERPL-MCNC: 0.73 MG/DL (ref 0.57–1)
DEPRECATED RDW RBC AUTO: 73 FL (ref 37–54)
EOSINOPHIL # BLD AUTO: 0.04 10*3/MM3 (ref 0–0.4)
EOSINOPHIL NFR BLD AUTO: 0.3 % (ref 0.3–6.2)
ERYTHROCYTE [DISTWIDTH] IN BLOOD BY AUTOMATED COUNT: 15.3 % (ref 12.3–15.4)
GFR SERPL CREATININE-BSD FRML MDRD: 81 ML/MIN/1.73
GLOBULIN UR ELPH-MCNC: 3.1 GM/DL
GLUCOSE SERPL-MCNC: 124 MG/DL (ref 65–99)
HCT VFR BLD AUTO: 46.4 % (ref 34–46.6)
HGB BLD-MCNC: 16.5 G/DL (ref 12–15.9)
IMM GRANULOCYTES # BLD AUTO: 0.07 10*3/MM3 (ref 0–0.05)
IMM GRANULOCYTES NFR BLD AUTO: 0.5 % (ref 0–0.5)
LEFT ATRIUM VOLUME INDEX: 27.3 ML/M2
LYMPHOCYTES # BLD AUTO: 0.96 10*3/MM3 (ref 0.7–3.1)
LYMPHOCYTES NFR BLD AUTO: 6.3 % (ref 19.6–45.3)
MAXIMAL PREDICTED HEART RATE: 158 BPM
MCH RBC QN AUTO: 45.2 PG (ref 26.6–33)
MCHC RBC AUTO-ENTMCNC: 35.6 G/DL (ref 31.5–35.7)
MCV RBC AUTO: 127.1 FL (ref 79–97)
MONOCYTES # BLD AUTO: 0.97 10*3/MM3 (ref 0.1–0.9)
MONOCYTES NFR BLD AUTO: 6.4 % (ref 5–12)
NEUTROPHILS NFR BLD AUTO: 13.03 10*3/MM3 (ref 1.7–7)
NEUTROPHILS NFR BLD AUTO: 86.1 % (ref 42.7–76)
NRBC BLD AUTO-RTO: 0 /100 WBC (ref 0–0.2)
PLATELET # BLD AUTO: 273 10*3/MM3 (ref 140–450)
PMV BLD AUTO: 9.2 FL (ref 6–12)
POTASSIUM SERPL-SCNC: 2.9 MMOL/L (ref 3.5–5.2)
POTASSIUM SERPL-SCNC: 4 MMOL/L (ref 3.5–5.2)
PROT SERPL-MCNC: 5.8 G/DL (ref 6–8.5)
RBC # BLD AUTO: 3.65 10*6/MM3 (ref 3.77–5.28)
SODIUM SERPL-SCNC: 138 MMOL/L (ref 136–145)
STRESS TARGET HR: 134 BPM
WBC NRBC COR # BLD: 15.13 10*3/MM3 (ref 3.4–10.8)

## 2022-01-18 PROCEDURE — 25010000002 METHYLPREDNISOLONE PER 40 MG: Performed by: NURSE PRACTITIONER

## 2022-01-18 PROCEDURE — 94799 UNLISTED PULMONARY SVC/PX: CPT

## 2022-01-18 PROCEDURE — 25010000002 HEPARIN (PORCINE) PER 1000 UNITS: Performed by: NURSE PRACTITIONER

## 2022-01-18 PROCEDURE — 80053 COMPREHEN METABOLIC PANEL: CPT | Performed by: NURSE PRACTITIONER

## 2022-01-18 PROCEDURE — 94761 N-INVAS EAR/PLS OXIMETRY MLT: CPT

## 2022-01-18 PROCEDURE — 25010000002 PIPERACILLIN SOD-TAZOBACTAM PER 1 G: Performed by: INTERNAL MEDICINE

## 2022-01-18 PROCEDURE — 85025 COMPLETE CBC W/AUTO DIFF WBC: CPT | Performed by: NURSE PRACTITIONER

## 2022-01-18 PROCEDURE — 25010000002 METHYLPREDNISOLONE PER 40 MG: Performed by: INTERNAL MEDICINE

## 2022-01-18 PROCEDURE — 99233 SBSQ HOSP IP/OBS HIGH 50: CPT | Performed by: INTERNAL MEDICINE

## 2022-01-18 PROCEDURE — 84132 ASSAY OF SERUM POTASSIUM: CPT | Performed by: INTERNAL MEDICINE

## 2022-01-18 RX ORDER — BUDESONIDE 0.5 MG/2ML
0.5 INHALANT ORAL
Status: DISCONTINUED | OUTPATIENT
Start: 2022-01-18 | End: 2022-01-21 | Stop reason: HOSPADM

## 2022-01-18 RX ORDER — METHYLPREDNISOLONE SODIUM SUCCINATE 40 MG/ML
40 INJECTION, POWDER, LYOPHILIZED, FOR SOLUTION INTRAMUSCULAR; INTRAVENOUS EVERY 12 HOURS
Status: COMPLETED | OUTPATIENT
Start: 2022-01-18 | End: 2022-01-18

## 2022-01-18 RX ORDER — PREDNISONE 20 MG/1
40 TABLET ORAL
Status: DISCONTINUED | OUTPATIENT
Start: 2022-01-19 | End: 2022-01-21 | Stop reason: HOSPADM

## 2022-01-18 RX ORDER — TRIAMTERENE AND HYDROCHLOROTHIAZIDE 37.5; 25 MG/1; MG/1
1 TABLET ORAL DAILY
Status: DISCONTINUED | OUTPATIENT
Start: 2022-01-18 | End: 2022-01-21 | Stop reason: HOSPADM

## 2022-01-18 RX ORDER — POTASSIUM CHLORIDE 1.5 G/1.77G
40 POWDER, FOR SOLUTION ORAL AS NEEDED
Status: DISCONTINUED | OUTPATIENT
Start: 2022-01-18 | End: 2022-01-21 | Stop reason: HOSPADM

## 2022-01-18 RX ORDER — LISINOPRIL 20 MG/1
20 TABLET ORAL
Status: DISCONTINUED | OUTPATIENT
Start: 2022-01-18 | End: 2022-01-19

## 2022-01-18 RX ORDER — AMLODIPINE BESYLATE 10 MG/1
10 TABLET ORAL
Status: DISCONTINUED | OUTPATIENT
Start: 2022-01-18 | End: 2022-01-21 | Stop reason: HOSPADM

## 2022-01-18 RX ORDER — DOXYCYCLINE 100 MG/1
100 CAPSULE ORAL EVERY 12 HOURS SCHEDULED
Status: DISCONTINUED | OUTPATIENT
Start: 2022-01-18 | End: 2022-01-21 | Stop reason: HOSPADM

## 2022-01-18 RX ORDER — POTASSIUM CHLORIDE 750 MG/1
40 CAPSULE, EXTENDED RELEASE ORAL AS NEEDED
Status: DISCONTINUED | OUTPATIENT
Start: 2022-01-18 | End: 2022-01-21 | Stop reason: HOSPADM

## 2022-01-18 RX ADMIN — HEPARIN SODIUM 5000 UNITS: 5000 INJECTION, SOLUTION INTRAVENOUS; SUBCUTANEOUS at 05:06

## 2022-01-18 RX ADMIN — FOLIC ACID 1 MG: 1 TABLET ORAL at 08:26

## 2022-01-18 RX ADMIN — DOXYCYCLINE 100 MG: 100 CAPSULE ORAL at 08:33

## 2022-01-18 RX ADMIN — NITROGLYCERIN 120 MCG/MIN: 20 INJECTION INTRAVENOUS at 09:07

## 2022-01-18 RX ADMIN — IPRATROPIUM BROMIDE AND ALBUTEROL SULFATE 3 ML: .5; 2.5 SOLUTION RESPIRATORY (INHALATION) at 13:13

## 2022-01-18 RX ADMIN — SODIUM CHLORIDE, PRESERVATIVE FREE 10 ML: 5 INJECTION INTRAVENOUS at 08:27

## 2022-01-18 RX ADMIN — NITROGLYCERIN 50 MCG/MIN: 20 INJECTION INTRAVENOUS at 23:48

## 2022-01-18 RX ADMIN — TRIAMTERENE AND HYDROCHLOROTHIAZIDE 1 TABLET: 37.5; 25 TABLET ORAL at 13:27

## 2022-01-18 RX ADMIN — MULTIVITAMIN TABLET 1 TABLET: TABLET at 08:26

## 2022-01-18 RX ADMIN — THIAMINE HCL TAB 100 MG 100 MG: 100 TAB at 08:26

## 2022-01-18 RX ADMIN — POTASSIUM CHLORIDE 40 MEQ: 1.5 POWDER, FOR SOLUTION ORAL at 17:50

## 2022-01-18 RX ADMIN — HEPARIN SODIUM 5000 UNITS: 5000 INJECTION, SOLUTION INTRAVENOUS; SUBCUTANEOUS at 20:03

## 2022-01-18 RX ADMIN — IPRATROPIUM BROMIDE AND ALBUTEROL SULFATE 3 ML: .5; 2.5 SOLUTION RESPIRATORY (INHALATION) at 19:31

## 2022-01-18 RX ADMIN — IPRATROPIUM BROMIDE AND ALBUTEROL SULFATE 3 ML: .5; 2.5 SOLUTION RESPIRATORY (INHALATION) at 07:39

## 2022-01-18 RX ADMIN — POTASSIUM CHLORIDE 40 MEQ: 1.5 POWDER, FOR SOLUTION ORAL at 13:27

## 2022-01-18 RX ADMIN — MONTELUKAST SODIUM 10 MG: 10 TABLET, COATED ORAL at 20:03

## 2022-01-18 RX ADMIN — METHYLPREDNISOLONE SODIUM SUCCINATE 40 MG: 40 INJECTION, POWDER, FOR SOLUTION INTRAMUSCULAR; INTRAVENOUS at 05:06

## 2022-01-18 RX ADMIN — METHYLPREDNISOLONE SODIUM SUCCINATE 40 MG: 40 INJECTION, POWDER, FOR SOLUTION INTRAMUSCULAR; INTRAVENOUS at 17:50

## 2022-01-18 RX ADMIN — HEPARIN SODIUM 5000 UNITS: 5000 INJECTION, SOLUTION INTRAVENOUS; SUBCUTANEOUS at 15:17

## 2022-01-18 RX ADMIN — DOXYCYCLINE 100 MG: 100 CAPSULE ORAL at 20:03

## 2022-01-18 RX ADMIN — ACETAMINOPHEN 650 MG: 325 TABLET, FILM COATED ORAL at 04:20

## 2022-01-18 RX ADMIN — BUDESONIDE 0.5 MG: 0.5 INHALANT RESPIRATORY (INHALATION) at 19:31

## 2022-01-18 RX ADMIN — IPRATROPIUM BROMIDE AND ALBUTEROL SULFATE 3 ML: .5; 2.5 SOLUTION RESPIRATORY (INHALATION) at 15:32

## 2022-01-18 RX ADMIN — TAZOBACTAM SODIUM AND PIPERACILLIN SODIUM 3.38 G: 375; 3 INJECTION, SOLUTION INTRAVENOUS at 05:06

## 2022-01-18 RX ADMIN — POTASSIUM CHLORIDE 40 MEQ: 1.5 POWDER, FOR SOLUTION ORAL at 08:22

## 2022-01-18 RX ADMIN — AMLODIPINE BESYLATE 10 MG: 10 TABLET ORAL at 08:33

## 2022-01-18 RX ADMIN — BREXPIPRAZOLE 1 MG: 1 TABLET ORAL at 08:21

## 2022-01-18 RX ADMIN — DULOXETINE 60 MG: 60 CAPSULE, DELAYED RELEASE ORAL at 08:26

## 2022-01-18 RX ADMIN — SODIUM CHLORIDE, PRESERVATIVE FREE 10 ML: 5 INJECTION INTRAVENOUS at 20:08

## 2022-01-18 RX ADMIN — LISINOPRIL 20 MG: 20 TABLET ORAL at 08:33

## 2022-01-18 NOTE — PROGRESS NOTES
"    [facility] Medicine Services  PROGRESS NOTE    Patient Name: Arlene Pandey  : 1960  MRN: [id number]    Date of Admission: 2022  Primary Care Physician: Poncho Garcia MD    Subjective   Subjective     CC:  Follow-up respiratory distress    HPI:  Concerned about whether she needs phlebotomy due to her high red blood cells.  States that her breathing is substantially better, she is asking to go home today.  Reports that she is supposed to be on oxygen which is \"my fault\" that she is not on it.  She is unsure how much she should be on.  Denies significant cough or shortness of breath.  Reports numerous brief stints of tobacco cessation in the past    ROS:  Gen- No fevers, chills  CV- No chest pain, palpitations  Resp- No cough, dyspnea  GI- No N/V/D, abd pain    Objective   Objective     Vital Signs:   Temp:  [98.1 °F (36.7 °C)-98.4 °F (36.9 °C)] 98.4 °F (36.9 °C)  Heart Rate:  [] 86  Resp:  [18-28] 18  BP: (141-203)/() 166/93  Flow (L/min):  [4-6] 4     Physical Exam:  Constitutional: No acute distress, awake, alert, chronically ill-appearing female sitting up in bed  HENT: NCAT, mucous membranes moist  Respiratory: Clear to auscultation bilaterally, respiratory effort normal, on nasal cannula  Cardiovascular: RRR, no murmurs, rubs, or gallops  Gastrointestinal: Positive bowel sounds, soft, nontender, nondistended  Musculoskeletal: No bilateral ankle edema; chronic venous stasis changes of the lower extremities  Psychiatric: Appropriate affect, cooperative  Neurologic: Speech clear, moving all extremities spontaneously    Results Reviewed:  LAB RESULTS:      Lab 22  0600 22  1053 22  0441 22  0440   WBC 15.13*  --  12.24*  --    HEMOGLOBIN 16.5*  --  18.6*  --    HEMATOCRIT 46.4  --  53.5*  --    PLATELETS 273  --  335  --    NEUTROS ABS 13.03*  --  9.86*  --    IMMATURE GRANS (ABS) 0.07*  --  0.16*  --    LYMPHS ABS 0.96  --  1.02  --  "   MONOS ABS 0.97*  --  1.08*  --    EOS ABS 0.04  --  0.00  --    .1*  --  130.5*  --    PROCALCITONIN  --   --  0.43*  --    LACTATE  --  1.9  --  2.5*         Lab 01/18/22  0600 01/17/22  1053 01/17/22  0441   SODIUM 138  --  139   POTASSIUM 2.9*  --  3.4*   CHLORIDE 91*  --  93*   CO2 37.0*  --  31.0*   ANION GAP 10.0  --  15.0   BUN 18  --  17   CREATININE 0.73  --  1.25*   GLUCOSE 124*  --  125*   CALCIUM 8.1*  --  8.5*   MAGNESIUM  --   --  2.2   HEMOGLOBIN A1C  --  4.90  --          Lab 01/18/22  0600 01/17/22  0441   TOTAL PROTEIN 5.8* 6.9   ALBUMIN 2.70* 3.20*   GLOBULIN 3.1 3.7   ALT (SGPT) 16 19   AST (SGOT) 24 36*   BILIRUBIN 0.8 1.0   ALK PHOS 116 196*         Lab 01/17/22  1608 01/17/22  0808 01/17/22  0441   PROBNP  --   --  12,613.0*   TROPONIN T 0.046* 0.083* 0.032*                 Lab 01/17/22  0732   PH, ARTERIAL 7.422   PCO2, ARTERIAL 53.0*   PO2 ART 68.7*   FIO2 60   HCO3 ART 34.5*   BASE EXCESS ART 7.7*   CARBOXYHEMOGLOBIN 5.2*     Brief Urine Lab Results  (Last result in the past 365 days)      Color   Clarity   Blood   Leuk Est   Nitrite   Protein   CREAT   Urine HCG        01/17/22 1104 Yellow   Clear   Trace   Negative   Negative   30 mg/dL (1+)                 Microbiology Results Abnormal     Procedure Component Value - Date/Time    Blood Culture - Blood, Arm, Left [[id number]]  (Normal) Collected: 01/17/22 0500    Lab Status: Preliminary result Specimen: Blood from Arm, Left Updated: 01/18/22 0630     Blood Culture No growth at 24 hours    Blood Culture - Blood, Arm, Right [[id number]]  (Normal) Collected: 01/17/22 0500    Lab Status: Preliminary result Specimen: Blood from Arm, Right Updated: 01/18/22 0630     Blood Culture No growth at 24 hours    COVID PRE-OP / PRE-PROCEDURE SCREENING ORDER (NO ISOLATION) - Swab, Nasopharynx [[id number]]  (Normal) Collected: 01/17/22 0515    Lab Status: Final result Specimen: Swab from Nasopharynx Updated: 01/17/22 0611    Narrative:      The  following orders were created for panel order COVID PRE-OP / PRE-PROCEDURE SCREENING ORDER (NO ISOLATION) - Swab, Nasopharynx.  Procedure                               Abnormality         Status                     ---------                               -----------         ------                     COVID-19, FLU A/B, RSV P...[[id number]]  Normal              Final result                 Please view results for these tests on the individual orders.    COVID-19, FLU A/B, RSV PCR - Swab, Nasopharynx [[id number]]  (Normal) Collected: 01/17/22 0515    Lab Status: Final result Specimen: Swab from Nasopharynx Updated: 01/17/22 0611     COVID19 Not Detected     Influenza A PCR Not Detected     Influenza B PCR Not Detected     RSV, PCR Not Detected    Narrative:      Fact sheet for providers: https://www.fda.gov/media/225105/download    Fact sheet for patients: https://www.fda.gov/media/845021/download    Test performed by PCR.          CT Head Without Contrast    Result Date: 1/17/2022  EXAMINATION: CT HEAD WO CONTRAST-  INDICATION: AMS; J96.21-Acute and chronic respiratory failure with hypoxia; J96.22-Acute and chronic respiratory failure with hypercapnia; J44.1-Chronic obstructive pulmonary disease with (acute) exacerbation; A41.9-Sepsis, unspecified organism; J18.9-Pneumonia, unspecified organism  TECHNIQUE: CT the head without IV contrast  COMPARISON: NONE  FINDINGS:  No acute intracranial hemorrhage. No acute large territory infarct. Normal size and configuration of the ventricles. No extra-axial collections. No midline shift or herniation. Unremarkable appearance of the orbits. No acute osseous findings. Opacification of the left maxillary sinus with wall thickening, as well as opacification of the left ethmoid air cells and left frontal sinus. The mastoid air cells are clear. No acute osseous findings. Unremarkable appearance of the orbits.      Impression:  No acute intracranial findings.  Left-sided predominant  paranasal sinus disease.   This report was finalized on 1/17/2022 10:10 AM by Yves Wilson MD.      XR Chest 1 View    Result Date: 1/17/2022  CR Chest 1 Vw INDICATION: Shortness of air. COMPARISON:  None available. FINDINGS: Portable AP view(s) of the chest.  Mild cardiomegaly. Mediastinal contours are normal. The lungs are clear. No pneumothorax or pleural effusion.     Impression: Mild cardiomegaly. No other acute chest findings. Signer Name: Cristino Khan MD  Signed: 1/17/2022 5:08 AM  Workstation Name: [id number]  [facility]    CT Chest Pulmonary Embolism    Result Date: 1/17/2022  EXAMINATION: CT CHEST PULMONARY EMBOLISM-  INDICATION: resp fx, COPD, r/o PE; J96.21-Acute and chronic respiratory failure with hypoxia; J96.22-Acute and chronic respiratory failure with hypercapnia; J44.1-Chronic obstructive pulmonary disease with (acute) exacerbation; A41.9-Sepsis, unspecified organism; J18.9-Pneumonia, unspecified organism  TECHNIQUE: CTA of the chest (PE protocol)  COMPARISON: NONE  FINDINGS:  The exam is somewhat limited owing to respiratory motion artifact particularly at the lung bases. Normal appearance of the pulmonary arteries without evidence of pulmonary embolism. No pericardial effusion. Unremarkable appearance of the thoracic aorta. Homogeneous attenuation of the thyroid gland. No bulky or pathologic mediastinal or hilar adenopathy. Unremarkable appearance of the chest wall soft tissues. No axillary lymphadenopathy. No acute findings in the partially imaged upper abdomen; the gallbladder surgically absent. The trachea and mainstem bronchi are patent. Moderate to severe paraseptal greater than centrilobular emphysema. Dependent bibasilar consolidations with crowding of the vasculature favored reflect dependent atelectasis. No acute osseous findings. No pneumothorax.      Impression:  The exam is somewhat limited owing to respiratory motion artifact particularly at the lung  bases.  Within the above-stated limitation, no evidence of pulmonary embolism.  Background moderate to severe paraseptal and centrilobular emphysema.  Dependent bibasilar consolidation with vascular crowding is favored reflect dependent bibasilar atelectasis.   This report was finalized on 1/17/2022 10:19 AM by Yves Wilson MD.            I have reviewed the medications:  Scheduled Meds:amLODIPine, 5 mg, Oral, Q24H  Brexpiprazole, 1 mg, Oral, Daily  doxycycline, 100 mg, Intravenous, Q12H  DULoxetine, 60 mg, Oral, Daily  thiamine, 100 mg, Oral, Daily   And  multivitamin, 1 tablet, Oral, Daily   And  folic acid, 1 mg, Oral, Daily  heparin (porcine), 5,000 Units, Subcutaneous, Q8H  ipratropium-albuterol, 3 mL, Nebulization, 4x Daily - RT  methylPREDNISolone sodium succinate, 40 mg, Intravenous, Q8H  montelukast, 10 mg, Oral, Nightly  piperacillin-tazobactam, 3.375 g, Intravenous, Q8H  sodium chloride, 10 mL, Intravenous, Q12H      Continuous Infusions:nitroglycerin, 5-200 mcg/min, Last Rate: 120 mcg/min (01/18/22 0437)      PRN Meds:.•  acetaminophen  •  ipratropium-albuterol  •  LORazepam **OR** LORazepam **OR** LORazepam **OR** LORazepam **OR** LORazepam **OR** LORazepam  •  potassium chloride  •  potassium chloride  •  sodium chloride  •  sodium chloride    Assessment/Plan   Assessment & Plan     Active Hospital Problems    Diagnosis  POA   • **COPD with acute exacerbation (HCC) [J44.1]  Yes   • Lactic acidosis [E87.2]  Yes   • Elevated troponin [R77.8]  Yes   • Hypokalemia [E87.6]  Yes   • Elevated serum creatinine [R79.89]  Yes   • Leukocytosis [D72.829]  Yes   • Hypertensive urgency [I16.0]  Yes   • Acute respiratory failure (HCC) [J96.00]  Yes   • Alcohol use [Z72.89]  Yes   • Tobacco use [Z72.0]  Yes   • Anxiety associated with depression [F41.8]  Yes   • Acute respiratory failure with hypoxia and hypercapnia (HCC) [J96.01, J96.02]  Yes   • Sepsis (HCC) [A41.9]  Yes      Resolved Hospital Problems   No  "resolved problems to display.        Brief Hospital Course to date:  Arlene Pandey is a 61 y.o. female with COPD/asthma, ongoing tobacco abuse, polycythemia, reported chronic hypoxic needs but not on oxygen who is brought to the hospital for evaluation of acute respiratory distress and encephalopathy responding rapidly for treatment of acute COPD/asthma    The following problems new to me today    Assessment/plan    Acute on chronic hypoxic respiratory failure  Acute exacerbation of COPD/asthma  Doubt sepsis  -Continues to smoke  - CTA without PE, severe emphysematous changes without clear infiltrate  - Weaned off BiPAP, down to 4 L/min NC this a.m. with SPO2 in the high 90s, continue to wean as tolerated  - Supposed to be on oxygen, data deficit, but she is not; anticipate need for O2 at discharge  -No evidence of sepsis, discontinue Zosyn, transition doxycycline to oral (for COPD exacerbation)  -IV steroids today, transition to oral prednisone tomorrow  -Continue scheduled DuoNebs; Pulmicort nebulizer added    Hypertensive crisis  -Wean nitro gtt as tolerated  - Increase amlodipine to 10 mg, restart lisinopril and Maxide    Elevated proBNP  Elevated troponins  -S/p IV Lasix on admission  - No clinical evidence of CHF, CT imaging without evidence for decompensation/edema; with her polycythemia I suspect a degree of pulmonary hypertension  -Troponins flat, no chest pain    Polycythemia  - Per patient her doctor says they \"cannot put a name on it\" but she receives regular phlebotomy  - Unclear what full work-up she has had, clinically secondary polycythemia from longstanding tobacco abuse and hypoxic respiratory insufficiency would be the most likely etiology  -Follow-up outpatient    High risk alcohol use  -Has previously quit and relapsed  -CIWA    Abnormal renal function  -No prior data, creatinine 1.25 on admission and now 0.73    S/p acute metabolic encephalopathy  -Now awake and oriented    Mood disorder, " NOS  -Continue Cymbalta    Ongoing tobacco abuse  -Patient has quit multiple times in the past and relapsed, expresses acknowledgment that she needs to quit but is unsure if she can    Obesity, BMI 35.86 kg/M2  -Complicates all aspects of care    DVT prophylaxis:  Medical DVT prophylaxis orders are present.            Disposition: Rapidly improved clinical status, lungs are clear, oxygen requirements rapidly improving; she expresses she has a special needs daughter at home that she needs to get home to care for, if she is doing well tomorrow and off the nitro drip she is a candidate for discharge home with steroid taper and close follow-up    CODE STATUS:   Code Status and Medical Interventions:   Ordered at: 01/17/22 0555     Code Status (Patient has no pulse and is not breathing):    CPR (Attempt to Resuscitate)     Medical Interventions (Patient has pulse or is breathing):    Full Support       Osvaldo Page, DO  01/18/22

## 2022-01-18 NOTE — CASE MANAGEMENT/SOCIAL WORK
Continued Stay Note  [facility]     Patient Name: Arlene Pandey  MRN: [id number]  Today's Date: 1/18/2022    Admit Date: 1/17/2022     Discharge Plan     Row Name 01/18/22 1146       Plan    Plan Home with home oxygen provided by [employer]    Plan Comments Patient qualifies for home oxygen. CM called and spoke with Felicia with [employer]. She will deliver to bedside. Patient and spouse in agreement with home O2/[employer] for DME. CM following.    Final Discharge Disposition Code 01 - home or self-care                                                             Kelly Nichols RN

## 2022-01-18 NOTE — CASE MANAGEMENT/SOCIAL WORK
"Discharge Planning Assessment  [facility]     Patient Name: Arlene Pandey  MRN: [id number]  Today's Date: 1/18/2022    Admit Date: 1/17/2022     Discharge Needs Assessment     Row Name 01/18/22 1894       Living Environment    Lives With spouse; child(karthikeyan), dependent    Name(s) of Who Lives With Patient Allan Pandey Spouse [phone]    Current Living Arrangements home/apartment/condo    Primary Care Provided by self    Provides Primary Care For child(karthikeyan)  has a handicapped daughter    Family Caregiver if Needed spouse    Family Caregiver Names Allan Pandey Spouse [phone]    Quality of Family Relationships supportive    Able to Return to Prior Arrangements yes       Resource/Environmental Concerns    Resource/Environmental Concerns none    Transportation Concerns car, none       Transition Planning    Patient/Family Anticipates Transition to home with family    Patient/Family Anticipated Services at Transition none    Transportation Anticipated family or friend will provide       Discharge Needs Assessment    Readmission Within the Last 30 Days no previous admission in last 30 days    Equipment Currently Used at Home nebulizer    Concerns to be Addressed denies needs/concerns at this time    Anticipated Changes Related to Illness none    Current Discharge Risk chronically ill; substance use/abuse               Discharge Plan     Row Name 01/18/22 4516       Plan    Plan Home    Plan Comments CM spoke with patient and spouse at bedside. Patient resides in [facility] with her spouse and handicapped daughter. Patient states she is the primary caregiver, however, daughter is currently home alone and is able to care for herself until her father returns home. Patient is independent with ADL's, denies any DME. Patient has a nebulizer machine in her home but she states, \" I don't use it like I should.\" Patient denies any home oxygen. Patient states, I smoke a lot and do not want to quit.\"  Patient denies any " current home health or outpatient services. Patient states she is able to afford/obtain her medications without difficulty. Patient denies any discharge planning needs. Patient may need/qualify for home oxygen at discharge. Addiction team consulted regarding ETOH use: daily. CM following.    Final Discharge Disposition Code 01 - home or self-care              Continued Care and Services - Admitted Since 1/17/2022    Coordination has not been started for this encounter.          Demographic Summary     Row Name 01/18/22 0930       General Information    Arrived From home    Referral Source emergency department    Reason for Consult discharge planning    Preferred Language English     Used During This Interaction no       Contact Information    Contact Information Comments Allan Pandey Spouse [phone]               Functional Status     Row Name 01/18/22 0930       Functional Status    Usual Activity Tolerance good    Current Activity Tolerance good       Functional Status, IADL    Medications independent    Meal Preparation independent    Housekeeping independent    Laundry independent    Shopping independent       Mental Status    General Appearance WDL WDL       Mental Status Summary    Recent Changes in Mental Status/Cognitive Functioning no changes       Employment/    Employment Status retired               Psychosocial    No documentation.                Abuse/Neglect    No documentation.                Legal    No documentation.                Substance Abuse    No documentation.                Patient Forms    No documentation.                   Kelly Nichols RN

## 2022-01-18 NOTE — CASE MANAGEMENT/SOCIAL WORK
Continued Stay Note  [facility]     Patient Name: Arlene Pandey  MRN: [id number]  Today's Date: 1/18/2022    Admit Date: 1/17/2022     Discharge Plan     Row Name 01/18/22 1649       Plan    Plan Pt. declines resources    Plan Comments Pt. was oriented and engaged at bedside. Pt. denies AUD and reports she drinks approximately 6 drinks in six hours per day. Pt. also states that she can quit drinking without any withdrawals and always has a designated .  Pt.denies treatment and feels she doesn't have a problem.                Discharge Codes    No documentation.                     Dimitrios Franklin, [facility]

## 2022-01-18 NOTE — PROGRESS NOTES
"  Referring Provider: DO Camilo  Reason for Consultation: AECOPD    Subjective .   Education:  NN spoke with pt at BS.  Pt alert and able to answer questions appropriately.  Pt O2 sat  91% on  4 L currently, no home O2 use.  Pt reports the ability to ambulate \"not very far\" at baseline before experiencing SOB.  Pt unable to state use of rescue medication, relief of SOB within ~2 mins.  Patient is up to date on COVID and flu vaccines, unsure on PNA vaccines.    Patient is a current tobacco user.  Tobacco cessation encouraged.  Discussion of smoking cessation consisted of assessment interview, provision of community resources, counseling on tobacco dependence, nonpharmacologic cessation techniques, medications and relapse prevention.   Smoking cessation education completed. Cessation support resources discussed with pt. This discussion lasted between  5 and  8 minutes.  Pt reports no issues at this time with medications or transportation for appointments.  Pt reports no previous hx of formal COPD teaching, no understanding of action plan, or [place].  Stop light report, NN contact information, instructions for accessing [facility] and list of educational videos given to pt.  \"A Patient's Guide to COPD\" booklet left at BS. 1800QUITNOW reference sheet discussed and given to patient at BS.  Benefits and various levels of pulmonary rehab explained.   COPD education completed in the form of explanation, handouts, and videos.  NN will continue to follow as needed.     Age: 61 y.o.  Sex: female  Smoker Status: current, ~72 pack years  Pulmonologist: EMILEE  FEV1 (PFT): EMILEE  Home O2: RA    Objective     SpO2 SpO2: 91 % (01/18/22 1003)  Device Device (Oxygen Therapy): humidified, nasal cannula (01/18/22 1000)  Flow Flow (L/min): 4 (01/18/22 1000)  Incentive Spirometer    IS Predicted Level (mL)     Number of Repetitions     Level Incentive Spirometer (mL)    Patient Tolerance     Inhaler Treatment Status    Treatment Route        Home " Medications:  Medications Prior to Admission   Medication Sig Dispense Refill Last Dose   • amLODIPine-benazepril (LOTREL 5-10) 5-10 MG per capsule Take 1 capsule by mouth Daily.      • Brexpiprazole 1 MG tablet Take  by mouth.      • DULoxetine (CYMBALTA) 60 MG capsule Take 60 mg by mouth Daily.      • montelukast (SINGULAIR) 10 MG tablet Take 10 mg by mouth Every Night.      • triamterene-hydrochlorothiazide (DYAZIDE) 37.5-25 MG per capsule Take 1 capsule by mouth Every Morning.          Discussion: Per current GOLD Standards, please consider: LAMA/LABA/ICS in place (Trelegy), Outpatient PFT, NRT at [place], Rehab as appropriate      Discussed with primary RN    Ivania Alejandro RN

## 2022-01-19 LAB
ANION GAP SERPL CALCULATED.3IONS-SCNC: 8 MMOL/L (ref 5–15)
BUN SERPL-MCNC: 16 MG/DL (ref 8–23)
BUN/CREAT SERPL: 27.1 (ref 7–25)
CALCIUM SPEC-SCNC: 8.7 MG/DL (ref 8.6–10.5)
CHLORIDE SERPL-SCNC: 91 MMOL/L (ref 98–107)
CO2 SERPL-SCNC: 38 MMOL/L (ref 22–29)
CREAT SERPL-MCNC: 0.59 MG/DL (ref 0.57–1)
DEPRECATED RDW RBC AUTO: 69.7 FL (ref 37–54)
ERYTHROCYTE [DISTWIDTH] IN BLOOD BY AUTOMATED COUNT: 15 % (ref 12.3–15.4)
GFR SERPL CREATININE-BSD FRML MDRD: 103 ML/MIN/1.73
GLUCOSE SERPL-MCNC: 122 MG/DL (ref 65–99)
HCT VFR BLD AUTO: 45.8 % (ref 34–46.6)
HGB BLD-MCNC: 16.3 G/DL (ref 12–15.9)
MCH RBC QN AUTO: 44.3 PG (ref 26.6–33)
MCHC RBC AUTO-ENTMCNC: 35.6 G/DL (ref 31.5–35.7)
MCV RBC AUTO: 124.5 FL (ref 79–97)
PLATELET # BLD AUTO: 279 10*3/MM3 (ref 140–450)
PMV BLD AUTO: 9.2 FL (ref 6–12)
POTASSIUM SERPL-SCNC: 3.9 MMOL/L (ref 3.5–5.2)
RBC # BLD AUTO: 3.68 10*6/MM3 (ref 3.77–5.28)
SODIUM SERPL-SCNC: 137 MMOL/L (ref 136–145)
WBC NRBC COR # BLD: 12.6 10*3/MM3 (ref 3.4–10.8)

## 2022-01-19 PROCEDURE — 94761 N-INVAS EAR/PLS OXIMETRY MLT: CPT

## 2022-01-19 PROCEDURE — 85027 COMPLETE CBC AUTOMATED: CPT | Performed by: INTERNAL MEDICINE

## 2022-01-19 PROCEDURE — 97161 PT EVAL LOW COMPLEX 20 MIN: CPT

## 2022-01-19 PROCEDURE — 63710000001 PREDNISONE PER 1 MG: Performed by: INTERNAL MEDICINE

## 2022-01-19 PROCEDURE — 97110 THERAPEUTIC EXERCISES: CPT

## 2022-01-19 PROCEDURE — 94799 UNLISTED PULMONARY SVC/PX: CPT

## 2022-01-19 PROCEDURE — 25010000002 HEPARIN (PORCINE) PER 1000 UNITS: Performed by: NURSE PRACTITIONER

## 2022-01-19 PROCEDURE — 99232 SBSQ HOSP IP/OBS MODERATE 35: CPT | Performed by: INTERNAL MEDICINE

## 2022-01-19 PROCEDURE — 80048 BASIC METABOLIC PNL TOTAL CA: CPT | Performed by: INTERNAL MEDICINE

## 2022-01-19 RX ORDER — LABETALOL HYDROCHLORIDE 5 MG/ML
20 INJECTION, SOLUTION INTRAVENOUS EVERY 4 HOURS PRN
Status: DISCONTINUED | OUTPATIENT
Start: 2022-01-19 | End: 2022-01-19

## 2022-01-19 RX ORDER — HYDRALAZINE HYDROCHLORIDE 20 MG/ML
20 INJECTION INTRAMUSCULAR; INTRAVENOUS EVERY 6 HOURS PRN
Status: DISCONTINUED | OUTPATIENT
Start: 2022-01-19 | End: 2022-01-19

## 2022-01-19 RX ORDER — LABETALOL HYDROCHLORIDE 5 MG/ML
10 INJECTION, SOLUTION INTRAVENOUS EVERY 6 HOURS PRN
Status: DISCONTINUED | OUTPATIENT
Start: 2022-01-19 | End: 2022-01-20

## 2022-01-19 RX ORDER — LISINOPRIL 20 MG/1
40 TABLET ORAL
Status: DISCONTINUED | OUTPATIENT
Start: 2022-01-19 | End: 2022-01-19

## 2022-01-19 RX ORDER — LISINOPRIL 20 MG/1
20 TABLET ORAL
Status: DISCONTINUED | OUTPATIENT
Start: 2022-01-19 | End: 2022-01-20

## 2022-01-19 RX ADMIN — DULOXETINE 60 MG: 60 CAPSULE, DELAYED RELEASE ORAL at 08:37

## 2022-01-19 RX ADMIN — MULTIVITAMIN TABLET 1 TABLET: TABLET at 08:38

## 2022-01-19 RX ADMIN — LABETALOL 20 MG/4 ML (5 MG/ML) INTRAVENOUS SYRINGE 20 MG: at 16:35

## 2022-01-19 RX ADMIN — DOXYCYCLINE 100 MG: 100 CAPSULE ORAL at 08:38

## 2022-01-19 RX ADMIN — SODIUM CHLORIDE, PRESERVATIVE FREE 10 ML: 5 INJECTION INTRAVENOUS at 20:12

## 2022-01-19 RX ADMIN — BUDESONIDE 0.5 MG: 0.5 INHALANT RESPIRATORY (INHALATION) at 08:48

## 2022-01-19 RX ADMIN — HEPARIN SODIUM 5000 UNITS: 5000 INJECTION, SOLUTION INTRAVENOUS; SUBCUTANEOUS at 05:58

## 2022-01-19 RX ADMIN — IPRATROPIUM BROMIDE AND ALBUTEROL SULFATE 3 ML: .5; 2.5 SOLUTION RESPIRATORY (INHALATION) at 08:48

## 2022-01-19 RX ADMIN — IPRATROPIUM BROMIDE AND ALBUTEROL SULFATE 3 ML: .5; 2.5 SOLUTION RESPIRATORY (INHALATION) at 16:34

## 2022-01-19 RX ADMIN — NITROGLYCERIN 170 MCG/MIN: 20 INJECTION INTRAVENOUS at 06:57

## 2022-01-19 RX ADMIN — BREXPIPRAZOLE 1 MG: 1 TABLET ORAL at 08:38

## 2022-01-19 RX ADMIN — HEPARIN SODIUM 5000 UNITS: 5000 INJECTION, SOLUTION INTRAVENOUS; SUBCUTANEOUS at 13:24

## 2022-01-19 RX ADMIN — LABETALOL 20 MG/4 ML (5 MG/ML) INTRAVENOUS SYRINGE 20 MG: at 11:14

## 2022-01-19 RX ADMIN — DOXYCYCLINE 100 MG: 100 CAPSULE ORAL at 20:10

## 2022-01-19 RX ADMIN — MONTELUKAST SODIUM 10 MG: 10 TABLET, COATED ORAL at 20:10

## 2022-01-19 RX ADMIN — PREDNISONE 40 MG: 20 TABLET ORAL at 08:37

## 2022-01-19 RX ADMIN — FOLIC ACID 1 MG: 1 TABLET ORAL at 08:38

## 2022-01-19 RX ADMIN — TRIAMTERENE AND HYDROCHLOROTHIAZIDE 1 TABLET: 37.5; 25 TABLET ORAL at 08:37

## 2022-01-19 RX ADMIN — THIAMINE HCL TAB 100 MG 100 MG: 100 TAB at 08:37

## 2022-01-19 RX ADMIN — LABETALOL 20 MG/4 ML (5 MG/ML) INTRAVENOUS SYRINGE 10 MG: at 20:11

## 2022-01-19 RX ADMIN — SODIUM CHLORIDE, PRESERVATIVE FREE 10 ML: 5 INJECTION INTRAVENOUS at 11:14

## 2022-01-19 RX ADMIN — IPRATROPIUM BROMIDE AND ALBUTEROL SULFATE 3 ML: .5; 2.5 SOLUTION RESPIRATORY (INHALATION) at 12:20

## 2022-01-19 RX ADMIN — HEPARIN SODIUM 5000 UNITS: 5000 INJECTION, SOLUTION INTRAVENOUS; SUBCUTANEOUS at 20:10

## 2022-01-19 RX ADMIN — AMLODIPINE BESYLATE 10 MG: 10 TABLET ORAL at 08:37

## 2022-01-19 RX ADMIN — IPRATROPIUM BROMIDE AND ALBUTEROL SULFATE 3 ML: .5; 2.5 SOLUTION RESPIRATORY (INHALATION) at 19:34

## 2022-01-19 RX ADMIN — BUDESONIDE 0.5 MG: 0.5 INHALANT RESPIRATORY (INHALATION) at 19:34

## 2022-01-19 RX ADMIN — LISINOPRIL 20 MG: 20 TABLET ORAL at 08:38

## 2022-01-19 NOTE — THERAPY EVALUATION
Patient Name: Arlene Pandey  : 1960    MRN: [id number]                              Today's Date: 2022       Admit Date: 2022    Visit Dx:     ICD-10-CM ICD-9-CM   1. Acute on chronic respiratory failure with hypoxia and hypercapnia (HCC)  J96.21 518.84    J96.22 786.09     799.02   2. Acute exacerbation of chronic obstructive pulmonary disease (COPD) (HCC)  J44.1 491.21   3. Acute sepsis (HCC)  A41.9 038.9     995.91   4. Pneumonia due to infectious organism, unspecified laterality, unspecified part of lung  J18.9 486     Patient Active Problem List   Diagnosis   • COPD with acute exacerbation (HCC)   • Lactic acidosis   • Elevated troponin   • Hypokalemia   • Elevated serum creatinine   • Leukocytosis   • Hypertensive urgency   • Acute respiratory failure (HCC)   • Alcohol use   • Tobacco use   • Anxiety associated with depression   • Acute respiratory failure with hypoxia and hypercapnia (HCC)   • Sepsis (HCC)     Past Medical History:   Diagnosis Date   • Asthma    • COPD (chronic obstructive pulmonary disease) (HCC)    • ETOH abuse    • Hypertension    • Mood disorder (HCC)    • Smoker      Past Surgical History:   Procedure Laterality Date   • APPENDECTOMY     • CHOLECYSTECTOMY        General Information     Row Name 22 1510          Physical Therapy Time and Intention    Document Type evaluation  -KG     Mode of Treatment physical therapy  -KG     Row Name 22 1510          General Information    Patient Profile Reviewed yes  -KG     Prior Level of Function independent:; all household mobility; ADL's  -KG     Existing Precautions/Restrictions oxygen therapy device and L/min  -KG     Barriers to Rehab none identified  -KG     Row Name 22 1510          Living Environment    Lives With spouse; child(karthikeyan), dependent  -KG     Row Name 22 1510          Home Main Entrance    Number of Stairs, Main Entrance other (see comments)  ramp  -KG     Row Name 22 1510           Stairs Within Home, Primary    Number of Stairs, Within Home, Primary none  -KG     Row Name 01/19/22 1510          Cognition    Orientation Status (Cognition) oriented x 3  -KG     Row Name 01/19/22 1510          Safety Issues, Functional Mobility    Safety Issues Affecting Function (Mobility) positioning of assistive device; insight into deficits/self-awareness  -KG     Impairments Affecting Function (Mobility) endurance/activity tolerance; shortness of breath; strength  -KG           User Key  (r) = Recorded By, (t) = Taken By, (c) = Cosigned By    Initials Name Provider Type    Zahra Becker Physical Therapist               Mobility     Row Name 01/19/22 1512          Bed Mobility    Comment (Bed Mobility) UIC  -KG     Row Name 01/19/22 1512          Transfers    Comment (Transfers) cues safe HP, SBA  -KG     Row Name 01/19/22 1512          Sit-Stand Transfer    Sit-Stand [place] (Transfers) standby assist  -KG     Assistive Device (Sit-Stand Transfers) walker, front-wheeled  -KG     Row Name 01/19/22 1512          Gait/Stairs (Locomotion)    [place] Level (Gait) standby assist  -KG     Assistive Device (Gait) walker, front-wheeled  -KG     Distance in Feet (Gait) 150  -KG     Deviations/Abnormal Patterns (Gait) gait speed decreased  -KG     Bilateral Gait Deviations forward flexed posture  -KG     Comment (Gait/Stairs) Pt able to ambulate 150', RW, SBA on 4L O2 with O2 dropping to 90% but quickly improved to 93% with seated rest break.  -KG           User Key  (r) = Recorded By, (t) = Taken By, (c) = Cosigned By    Initials Name Provider Type    Zahra Becker Physical Therapist               Obj/Interventions     Row Name 01/19/22 1514          Strength Comprehensive (MMT)    General Manual Muscle Testing (MMT) Assessment lower extremity strength deficits identified  -KG     Comment, General Manual Muscle Testing (MMT) Assessment 4/5 BLE  -KG     Row Name 01/19/22 1514          Motor  Skills    Motor Skills therapeutic exercise  -KG     Therapeutic Exercise knee; ankle  -KG     Row Name 01/19/22 1514          Knee (Therapeutic Exercise)    Knee (Therapeutic Exercise) strengthening exercise  -KG     Knee Strengthening (Therapeutic Exercise) bilateral; LAQ (long arc quad); SLR (straight leg raise); 10 repetitions; 2 sets; heel slides  -KG     Row Name 01/19/22 1514          Ankle (Therapeutic Exercise)    Ankle (Therapeutic Exercise) AROM (active range of motion)  -KG     Ankle AROM (Therapeutic Exercise) bilateral; dorsiflexion; plantarflexion; 2 sets; 10 repetitions  -KG     Row Name 01/19/22 1514          Balance    Balance Assessment sitting static balance; standing dynamic balance  -KG     Static Sitting Balance WFL  -KG     Dynamic Standing Balance WFL; supported; standing  -KG     Balance Interventions standing; sit to stand; weight shifting activity  -KG           User Key  (r) = Recorded By, (t) = Taken By, (c) = Cosigned By    Initials Name Provider Type    KG Zahra Yee Physical Therapist               Goals/Plan     Row Name 01/19/22 1520          Bed Mobility Goal 1 (PT)    Activity/Assistive Device (Bed Mobility Goal 1, PT) sit to supine/supine to sit  -KG     [place] Level/Cues Needed (Bed Mobility Goal 1, PT) independent  -KG     Time Frame (Bed Mobility Goal 1, PT) short term goal (STG); 3 days  -KG     Progress/Outcomes (Bed Mobility Goal 1, PT) continuing progress toward goal  -KG     Row Name 01/19/22 1520          Transfer Goal 1 (PT)    Activity/Assistive Device (Transfer Goal 1, PT) sit-to-stand/stand-to-sit; bed-to-chair/chair-to-bed  -KG     [place] Level/Cues Needed (Transfer Goal 1, PT) independent  -KG     Time Frame (Transfer Goal 1, PT) short term goal (STG); 3 days  -KG     Progress/Outcome (Transfer Goal 1, PT) continuing progress toward goal  -KG     Row Name 01/19/22 1520          Gait Training Goal 1 (PT)    Activity/Assistive Device (Gait  Training Goal 1, PT) gait (walking locomotion); walker, rolling  -KG     [place] Level (Gait Training Goal 1, PT) independent  -KG     Distance (Gait Training Goal 1, PT) 350  -KG     Time Frame (Gait Training Goal 1, PT) long term goal (LTG); 10 days  -KG     Progress/Outcome (Gait Training Goal 1, PT) continuing progress toward goal  -KG           User Key  (r) = Recorded By, (t) = Taken By, (c) = Cosigned By    Initials Name Provider Type    KG Zahra Yee Physical Therapist               Clinical Impression     Row Name 01/19/22 1516          Pain    Additional Documentation Pain Scale: Numbers Pre/Post-Treatment (Group)  -KG     Row Name 01/19/22 1516          Pain Scale: Numbers Pre/Post-Treatment    Pretreatment Pain Rating 0/10 - no pain  -KG     Posttreatment Pain Rating 0/10 - no pain  -KG     Row Name 01/19/22 1512          Plan of Care Review    Plan of Care Reviewed With patient  -KG     Progress improving  -KG     Outcome Summary PT eval performed: Patient presenting with decline in stamina and decline in function.  SBA for transfers.  Pt able to ambulate 150', RW, SBA on 4L O2 with O2 dropping to 90% but quickly improved to 93% with seated rest break.  Recommend pt be issued a front wheeled walker and home with HHPT.  -KG     Row Name 01/19/22 1513          Therapy Assessment/Plan (PT)    Rehab Potential (PT) good, to achieve stated therapy goals  -KG     Criteria for Skilled Interventions Met (PT) yes; meets criteria; skilled treatment is necessary  -KG     Row Name 01/19/22 1511          Vital Signs    Pre Systolic BP Rehab 172  -KG     Pre Treatment Diastolic   -KG     Post Systolic BP Rehab 173  -KG     Post Treatment Diastolic   -KG     Pre SpO2 (%) 92  -KG     O2 Delivery Pre Treatment nasal cannula  -KG     Intra SpO2 (%) 90  -KG     O2 Delivery Intra Treatment nasal cannula  -KG     Post SpO2 (%) 94  -KG     O2 Delivery Post Treatment nasal cannula  -KG     Row Name  01/19/22 1516          Positioning and Restraints    Pre-Treatment Position sitting in chair/recliner  -KG     Post Treatment Position chair  -KG     In Chair notified nsg; call light within reach; encouraged to call for assist; exit alarm on; waffle cushion; legs elevated  -KG           User Key  (r) = Recorded By, (t) = Taken By, (c) = Cosigned By    Initials Name Provider Type    Zahra Becker Physical Therapist               Outcome Measures     Row Name 01/19/22 1522          How much help from another person do you currently need...    Turning from your back to your side while in flat bed without using bedrails? 4  -KG     Moving from lying on back to sitting on the side of a flat bed without bedrails? 3  -KG     Moving to and from a bed to a chair (including a wheelchair)? 3  -KG     Standing up from a chair using your arms (e.g., wheelchair, bedside chair)? 3  -KG     Climbing 3-5 steps with a railing? 3  -KG     To walk in hospital room? 3  -KG     AM-PAC 6 Clicks Score (PT) 19  -KG     Row Name 01/19/22 1522          Functional Assessment    Outcome Measure Options AM-PAC 6 Clicks Basic Mobility (PT)  -KG           User Key  (r) = Recorded By, (t) = Taken By, (c) = Cosigned By    Initials Name Provider Type    Zahra Becker Physical Therapist                             Physical Therapy Education                 Title: PT OT SLP Therapies (Done)     Topic: Physical Therapy (Done)     Point: Mobility training (Done)     Learning Progress Summary           Patient Acceptance, E,TB, VU by KG at 1/19/2022 1523                   Point: Home exercise program (Done)     Learning Progress Summary           Patient Acceptance, E,TB, VU by KG at 1/19/2022 1523                   Point: Body mechanics (Done)     Learning Progress Summary           Patient Acceptance, E,TB, VU by KG at 1/19/2022 1523                   Point: Precautions (Done)     Learning Progress Summary           Patient Acceptance,  E,TB, VU by KG at 1/19/2022 1523                               User Key     Initials Effective Dates Name Provider Type Discipline    KG 06/16/21 -  Zahra Yee Physical Therapist PT              PT Recommendation and Plan     Plan of Care Reviewed With: patient  Progress: improving  Outcome Summary: PT eval performed: Patient presenting with decline in stamina and decline in function.  SBA for transfers.  Pt able to ambulate 150', RW, SBA on 4L O2 with O2 dropping to 90% but quickly improved to 93% with seated rest break.  Recommend pt be issued a front wheeled walker and home with HHPT.     Time Calculation:    PT Charges     Row Name 01/19/22 1526             Time Calculation    Start Time 1330  -KG      PT Received On 01/19/22  -KG      PT Goal Re-Cert Due Date 01/29/22  -KG              Time Calculation- PT    Total Timed Code Minutes- PT 8 minute(s)  -KG              Timed Charges    88032 - PT Therapeutic Exercise Minutes 8  -KG              Untimed Charges    PT Eval/Re-eval Minutes 50  -KG              Total Minutes    Timed Charges Total Minutes 8  -KG      Untimed Charges Total Minutes 50  -KG       Total Minutes 58  -KG            User Key  (r) = Recorded By, (t) = Taken By, (c) = Cosigned By    Initials Name Provider Type    KG Zahra Yee Physical Therapist              Therapy Charges for Today     Code Description Service Date Service Provider Modifiers Qty    08135108035 HC PT THER PROC EA 15 MIN 1/19/2022 Zahra Yee GP 1    39614135208 HC PT EVAL LOW COMPLEXITY 3 1/19/2022 Zahra Yee GP 1          PT G-Codes  Outcome Measure Options: AM-PAC 6 Clicks Basic Mobility (PT)  AM-PAC 6 Clicks Score (PT): Daniel Yee  1/19/2022

## 2022-01-19 NOTE — PROGRESS NOTES
[facility] Medicine Services  PROGRESS NOTE    Patient Name: Arlene Pandey  : 1960  MRN: [id number]    Date of Admission: 2022  Primary Care Physician: Poncho Garcia MD    Subjective   Subjective     CC: Follow-up respiratory failure    HPI: No acute events overnight, patient rested well, stated breathing is improved.    ROS:  Gen- No fevers, chills  CV- No chest pain, palpitations  Resp-+cough, dyspnea  GI- No N/V/D, abd pain    All other systems reviewed and negative  Objective   Objective     Vital Signs:   Temp:  [96.8 °F (36 °C)-98.5 °F (36.9 °C)] 98.3 °F (36.8 °C)  Heart Rate:  [] 86  Resp:  [18] 18  BP: (150-197)/() 197/119  Flow (L/min):  [2-4] 3     Physical Exam:  Constitutional: Chronically ill-appearing female, in no acute distress, seated in chair  HENT: NCAT, mucous membranes moist  Respiratory: Diffuse coarse breath sounds, mild expiratory wheezes on 3 L NC.   Cardiovascular: RRR, no murmurs, rubs, or gallops  Gastrointestinal: Positive bowel sounds, soft, nontender, nondistended  Musculoskeletal: No bilateral ankle edema  Psychiatric: Appropriate affect, cooperative  Neurologic: Oriented x 3, focal  Skin: No rashes    Results Reviewed:  LAB RESULTS:      Lab 22  0345 22  0600 22  1053 22  0441 22  0440   WBC 12.60* 15.13*  --  12.24*  --    HEMOGLOBIN 16.3* 16.5*  --  18.6*  --    HEMATOCRIT 45.8 46.4  --  53.5*  --    PLATELETS 279 273  --  335  --    NEUTROS ABS  --  13.03*  --  9.86*  --    IMMATURE GRANS (ABS)  --  0.07*  --  0.16*  --    LYMPHS ABS  --  0.96  --  1.02  --    MONOS ABS  --  0.97*  --  1.08*  --    EOS ABS  --  0.04  --  0.00  --    .5* 127.1*  --  130.5*  --    PROCALCITONIN  --   --   --  0.43*  --    LACTATE  --   --  1.9  --  2.5*         Lab 22  0345 22  0622  1053 22  0441   SODIUM 137  --  138  --  139   POTASSIUM 3.9 4.0 2.9*  --  3.4*    CHLORIDE 91*  --  91*  --  93*   CO2 38.0*  --  37.0*  --  31.0*   ANION GAP 8.0  --  10.0  --  15.0   BUN 16  --  18  --  17   CREATININE 0.59  --  0.73  --  1.25*   GLUCOSE 122*  --  124*  --  125*   CALCIUM 8.7  --  8.1*  --  8.5*   MAGNESIUM  --   --   --   --  2.2   HEMOGLOBIN A1C  --   --   --  4.90  --          Lab 01/18/22  0600 01/17/22  0441   TOTAL PROTEIN 5.8* 6.9   ALBUMIN 2.70* 3.20*   GLOBULIN 3.1 3.7   ALT (SGPT) 16 19   AST (SGOT) 24 36*   BILIRUBIN 0.8 1.0   ALK PHOS 116 196*         Lab 01/17/22  1608 01/17/22  0808 01/17/22  0441   PROBNP  --   --  12,613.0*   TROPONIN T 0.046* 0.083* 0.032*                 Lab 01/17/22  0732   PH, ARTERIAL 7.422   PCO2, ARTERIAL 53.0*   PO2 ART 68.7*   FIO2 60   HCO3 ART 34.5*   BASE EXCESS ART 7.7*   CARBOXYHEMOGLOBIN 5.2*     Brief Urine Lab Results  (Last result in the past 365 days)      Color   Clarity   Blood   Leuk Est   Nitrite   Protein   CREAT   Urine HCG        01/17/22 1104 Yellow   Clear   Trace   Negative   Negative   30 mg/dL (1+)                 Microbiology Results Abnormal     Procedure Component Value - Date/Time    Blood Culture - Blood, Arm, Left [[id number]]  (Normal) Collected: 01/17/22 0500    Lab Status: Preliminary result Specimen: Blood from Arm, Left Updated: 01/19/22 0630     Blood Culture No growth at 2 days    Blood Culture - Blood, Arm, Right [[id number]]  (Normal) Collected: 01/17/22 0500    Lab Status: Preliminary result Specimen: Blood from Arm, Right Updated: 01/19/22 0630     Blood Culture No growth at 2 days    COVID PRE-OP / PRE-PROCEDURE SCREENING ORDER (NO ISOLATION) - Swab, Nasopharynx [[id number]]  (Normal) Collected: 01/17/22 0515    Lab Status: Final result Specimen: Swab from Nasopharynx Updated: 01/17/22 0611    Narrative:      The following orders were created for panel order COVID PRE-OP / PRE-PROCEDURE SCREENING ORDER (NO ISOLATION) - Swab, Nasopharynx.  Procedure                               Abnormality          Status                     ---------                               -----------         ------                     COVID-19, FLU A/B, RSV P...[[id number]]  Normal              Final result                 Please view results for these tests on the individual orders.    COVID-19, FLU A/B, RSV PCR - Swab, Nasopharynx [[id number]]  (Normal) Collected: 01/17/22 0515    Lab Status: Final result Specimen: Swab from Nasopharynx Updated: 01/17/22 0611     COVID19 Not Detected     Influenza A PCR Not Detected     Influenza B PCR Not Detected     RSV, PCR Not Detected    Narrative:      Fact sheet for providers: https://www.fda.gov/media/996130/download    Fact sheet for patients: https://www.fda.gov/media/911321/download    Test performed by PCR.          Adult Transthoracic Echo Complete W/ Cont if Necessary Per Protocol    Result Date: 1/18/2022  · Left ventricular wall thickness is consistent with moderate concentric hypertrophy. · Left ventricular ejection fraction appears to be 51 - 55%. Left ventricular systolic function is normal. · Left ventricular diastolic function is consistent with (grade I) impaired relaxation. · Estimated right ventricular systolic pressure from tricuspid regurgitation is mildly elevated (35-45 mmHg).        Results for orders placed during the hospital encounter of 01/17/22    Adult Transthoracic Echo Complete W/ Cont if Necessary Per Protocol    Interpretation Summary  · Left ventricular wall thickness is consistent with moderate concentric hypertrophy.  · Left ventricular ejection fraction appears to be 51 - 55%. Left ventricular systolic function is normal.  · Left ventricular diastolic function is consistent with (grade I) impaired relaxation.  · Estimated right ventricular systolic pressure from tricuspid regurgitation is mildly elevated (35-45 mmHg).      I have reviewed the medications:  Scheduled Meds:amLODIPine, 10 mg, Oral, Q24H  Brexpiprazole, 1 mg, Oral, Daily  budesonide, 0.5 mg,  Nebulization, BID - RT  doxycycline, 100 mg, Oral, Q12H  DULoxetine, 60 mg, Oral, Daily  thiamine, 100 mg, Oral, Daily   And  multivitamin, 1 tablet, Oral, Daily   And  folic acid, 1 mg, Oral, Daily  heparin (porcine), 5,000 Units, Subcutaneous, Q8H  ipratropium-albuterol, 3 mL, Nebulization, 4x Daily - RT  lisinopril, 20 mg, Oral, Q24H  montelukast, 10 mg, Oral, Nightly  pharmacy consult - MTM, , Does not apply, Daily  predniSONE, 40 mg, Oral, Daily With Breakfast  sodium chloride, 10 mL, Intravenous, Q12H  triamterene-hydrochlorothiazide, 1 tablet, Oral, Daily      Continuous Infusions:   PRN Meds:.•  acetaminophen  •  ipratropium-albuterol  •  labetalol  •  LORazepam **OR** LORazepam **OR** LORazepam **OR** LORazepam **OR** LORazepam **OR** LORazepam  •  potassium chloride  •  potassium chloride  •  sodium chloride  •  sodium chloride    Assessment/Plan   Assessment & Plan     Active Hospital Problems    Diagnosis  POA   • **COPD with acute exacerbation (HCC) [J44.1]  Yes   • Lactic acidosis [E87.2]  Yes   • Elevated troponin [R77.8]  Yes   • Hypokalemia [E87.6]  Yes   • Elevated serum creatinine [R79.89]  Yes   • Leukocytosis [D72.829]  Yes   • Hypertensive urgency [I16.0]  Yes   • Acute respiratory failure (HCC) [J96.00]  Yes   • Alcohol use [Z72.89]  Yes   • Tobacco use [Z72.0]  Yes   • Anxiety associated with depression [F41.8]  Yes   • Acute respiratory failure with hypoxia and hypercapnia (HCC) [J96.01, J96.02]  Yes   • Sepsis (HCC) [A41.9]  Yes      Resolved Hospital Problems   No resolved problems to display.        Brief Hospital Course to date:  Arlene Pandey is a 61 y.o. female with COPD/asthma, ongoing tobacco abuse, polycythemia, reported chronic hypoxic needs but not on oxygen who is brought to the hospital for evaluation of acute respiratory distress and encephalopathy responding rapidly for treatment of acute COPD/asthma     Acute on chronic hypoxic respiratory failure  Acute exacerbation of  "COPD/asthma  Doubt sepsis  -Unfortunately she continues to smoke  - CTA without PE, severe emphysematous changes without clear infiltrate  - Weaned off BiPAP, down to 3 L NC, she is supposed to be on oxygen, data deficit, but she is not;  discharge home with O2   -No evidence of sepsis, discontinued Zosyn, transitioned to oral doxycycline for COPD exacerbation   -s/p IV steroids transition to oral prednisone 40 mg to complete 5-day course  -s/p scheduled DuoNebs; Pulmicort nebulizer.     Hypertensive crisis  -s/p nitro gtt without much improvement  - Amlodipine increased to 10 mg, Lisinopril  20 mg, continue Maxide.  --Added as needed labetalol, may consider Cardene gtt. as needed     Elevated proBNP  Elevated troponins  -S/p IV Lasix on admission  - No clinical evidence of CHF, CT imaging without evidence for decompensation/edema; with her polycythemia I suspect a degree of pulmonary hypertension  -Troponins flat, no chest pain     Polycythemia  - Per patient her doctor says they \"cannot put a name on it\" but she receives regular phlebotomy  - Unclear what full work-up she has had, clinically secondary polycythemia from longstanding tobacco abuse and hypoxic respiratory insufficiency would be the most likely etiology  -Follow-up outpatient     High risk alcohol use  -Has previously quit and relapsed  -s/p CIWA     Abnormal renal function, resolved  -No prior data, creatinine 1.25 on admission and now 0.73     S/p acute metabolic encephalopathy  -Now awake and oriented     Mood disorder, NOS  -Continue Cymbalta     Ongoing tobacco abuse  -Patient has quit multiple times in the past and relapsed, expresses acknowledgment that she needs to quit but is unsure if she can     Obesity, BMI 35.86 kg/M2  -Complicates all aspects of care     DVT prophylaxis:  Medical DVT prophylaxis orders are present.     AM-PAC 6 Clicks Score (PT): 19 (01/18/22 5590)    Disposition: TBD    CODE STATUS:   Code Status and Medical " Interventions:   Ordered at: 01/17/22 0555     Code Status (Patient has no pulse and is not breathing):    CPR (Attempt to Resuscitate)     Medical Interventions (Patient has pulse or is breathing):    Full Support       Joshua Moise MD  01/19/22

## 2022-01-19 NOTE — PLAN OF CARE
Goal Outcome Evaluation:  Plan of Care Reviewed With: patient        Progress: improving  Outcome Summary: PT eval performed: Patient presenting with decline in stamina and decline in function.  SBA for transfers.  Pt able to ambulate 150', RW, SBA on 4L O2 with O2 dropping to 90% but quickly improved to 93% with seated rest break.  Recommend pt be issued a front wheeled walker and home with HHPT.

## 2022-01-19 NOTE — DISCHARGE SUMMARY
[facility] Medicine Services  DISCHARGE SUMMARY    Patient Name: Arlene Pandey  : 1960  MRN: [id number]    Date of Admission: 2022  4:34 AM  Date of Discharge:  2022  Primary Care Physician: Poncho Garcia MD    Consults     No orders found from 2021 to 2022.          Hospital Course     Presenting Problem:   Acute on chronic respiratory failure with hypoxia and hypercapnia (HCC) [J96.21, J96.22]    Active Hospital Problems    Diagnosis  POA   • Acute respiratory failure (HCC) [J96.00]  Yes   • Alcohol use [Z72.89]  Yes   • Tobacco use [Z72.0]  Yes   • Anxiety associated with depression [F41.8]  Yes      Resolved Hospital Problems    Diagnosis Date Resolved POA   • **COPD with acute exacerbation (HCC) [J44.1] 2022 Yes   • Lactic acidosis [E87.2] 2022 Yes   • Elevated troponin [R77.8] 2022 Yes   • Hypokalemia [E87.6] 2022 Yes   • Elevated serum creatinine [R79.89] 2022 Yes   • Leukocytosis [D72.829] 2022 Yes   • Hypertensive urgency [I16.0] 2022 Yes   • Acute respiratory failure with hypoxia and hypercapnia (HCC) [J96.01, J96.02] 2022 Yes   • Sepsis (HCC) [A41.9] 2022 Yes      Hospital Course:  Arlene Pandey is a 61 y.o. female with COPD/asthma, ongoing tobacco abuse, polycythemia, reported chronic hypoxic needs but not on oxygen who is brought to the hospital for evaluation of acute respiratory distress and encephalopathy responding rapidly for treatment of acute COPD/asthma     Acute on chronic hypoxic respiratory failure  Acute exacerbation of COPD/asthma  Doubt sepsis  -Unfortunately she continues to smoke  - CTA without PE, severe emphysematous changes without clear infiltrate  - Weaned off BiPAP, down to 3 L NC, she is supposed to be on oxygen, data deficit, but she is not;  we will discharge home with O2   -No evidence of sepsis, discontinued Zosyn, transitioned to oral doxycycline for COPD  "exacerbation, completed 5-day course  -s/p IV steroids transition to oral prednisone 40 mg to complete 5-day course  -s/p scheduled DuoNebs; Pulmicort nebulizer.     Hypertensive crisis  -BP is much improved but not optimal she is s/p,Cardene gtt.   -Continue amlodipine at increased dose of 10 mg daily, lisinopril at increased dose of 40 mg qhs and Maxide  -Added hydralazine 10 mg 3 times daily  -Plan to follow-up with PCP next week for BP check and med adjustment    Elevated proBNP  Elevated troponins  -S/p IV Lasix on admission  - No clinical evidence of CHF, CT imaging without evidence for decompensation/edema; with her polycythemia I suspect a degree of pulmonary hypertension  -Troponins flat, no chest pain     Polycythemia  - Per patient her doctor says they \"cannot put a name on it\" but she receives regular phlebotomy  - Unclear what full work-up she has had, clinically secondary polycythemia from longstanding tobacco abuse and hypoxic respiratory insufficiency would be the most likely etiology  -Follow-up outpatient     High risk alcohol use  -Has previously quit and relapsed  -s/p CIWA     Abnormal renal function, resolved  -No prior data, creatinine 1.25 on admission and now 0.73     S/p acute metabolic encephalopathy  -Now awake and oriented     Mood disorder, NOS  -Continue Cymbalta     Ongoing tobacco abuse  -Patient has quit multiple times in the past and relapsed, expresses acknowledgment that she needs to quit but is unsure if she can     Obesity, BMI 35.86 kg/M2  -Complicates all aspects of care      Discharge Follow Up Recommendations for outpatient labs/diagnostics:  Follow-up with PCP in 1 week for BP check    Day of Discharge     HPI: No acute events overnight, patient rested well    Review of Systems  Gen- No fevers, chills  CV- No chest pain, palpitations  Resp- + cough,+ dyspnea  GI- No N/V/D, abd pain    All other systems reviewed and are negative  Vital Signs:   Temp:  [97.5 °F (36.4 " °C)-98.4 °F (36.9 °C)] 98.3 °F (36.8 °C)  Heart Rate:  [] 84  Resp:  [18-20] 18  BP: (120-186)/() 161/94  Flow (L/min):  [2] 2      Physical Exam:  Constitutional: Chronically ill-appearing lady, no acute distress, awake, alert  HENT: NCAT, mucous membranes moist  Respiratory: Clear to auscultation bilaterally, respiratory effort normal on 3 L NC  Cardiovascular: RRR, no murmurs, rubs, or gallops  Gastrointestinal: Positive bowel sounds, soft, nontender, nondistended  Musculoskeletal: No bilateral ankle edema  Psychiatric: Appropriate affect, cooperative  Neurologic: Oriented x 3, nonfocal  Skin: No rashes      Pertinent  and/or Most Recent Results     LAB RESULTS:      Lab 01/19/22 0345 01/18/22 0600 01/17/22  1053 01/17/22  0441 01/17/22  0440   WBC 12.60* 15.13*  --  12.24*  --    HEMOGLOBIN 16.3* 16.5*  --  18.6*  --    HEMATOCRIT 45.8 46.4  --  53.5*  --    PLATELETS 279 273  --  335  --    NEUTROS ABS  --  13.03*  --  9.86*  --    IMMATURE GRANS (ABS)  --  0.07*  --  0.16*  --    LYMPHS ABS  --  0.96  --  1.02  --    MONOS ABS  --  0.97*  --  1.08*  --    EOS ABS  --  0.04  --  0.00  --    .5* 127.1*  --  130.5*  --    PROCALCITONIN  --   --   --  0.43*  --    LACTATE  --   --  1.9  --  2.5*         Lab 01/19/22 0345 01/18/22 2029 01/18/22  0600 01/17/22  1053 01/17/22  0441   SODIUM 137  --  138  --  139   POTASSIUM 3.9 4.0 2.9*  --  3.4*   CHLORIDE 91*  --  91*  --  93*   CO2 38.0*  --  37.0*  --  31.0*   ANION GAP 8.0  --  10.0  --  15.0   BUN 16  --  18  --  17   CREATININE 0.59  --  0.73  --  1.25*   GLUCOSE 122*  --  124*  --  125*   CALCIUM 8.7  --  8.1*  --  8.5*   MAGNESIUM  --   --   --   --  2.2   HEMOGLOBIN A1C  --   --   --  4.90  --          Lab 01/18/22  0600 01/17/22  0441   TOTAL PROTEIN 5.8* 6.9   ALBUMIN 2.70* 3.20*   GLOBULIN 3.1 3.7   ALT (SGPT) 16 19   AST (SGOT) 24 36*   BILIRUBIN 0.8 1.0   ALK PHOS 116 196*         Lab 01/17/22  1608 01/17/22  0808 01/17/22  0441    PROBNP  --   --  12,613.0*   TROPONIN T 0.046* 0.083* 0.032*                 Lab 01/17/22  0732   PH, ARTERIAL 7.422   PCO2, ARTERIAL 53.0*   PO2 ART 68.7*   FIO2 60   HCO3 ART 34.5*   BASE EXCESS ART 7.7*   CARBOXYHEMOGLOBIN 5.2*     Brief Urine Lab Results  (Last result in the past 365 days)      Color   Clarity   Blood   Leuk Est   Nitrite   Protein   CREAT   Urine HCG        01/17/22 1104 Yellow   Clear   Trace   Negative   Negative   30 mg/dL (1+)               Microbiology Results (last 10 days)     Procedure Component Value - Date/Time    COVID PRE-OP / PRE-PROCEDURE SCREENING ORDER (NO ISOLATION) - Swab, Nasopharynx [[id number]]  (Normal) Collected: 01/17/22 0515    Lab Status: Final result Specimen: Swab from Nasopharynx Updated: 01/17/22 0611    Narrative:      The following orders were created for panel order COVID PRE-OP / PRE-PROCEDURE SCREENING ORDER (NO ISOLATION) - Swab, Nasopharynx.  Procedure                               Abnormality         Status                     ---------                               -----------         ------                     COVID-19, FLU A/B, RSV P...[[id number]]  Normal              Final result                 Please view results for these tests on the individual orders.    COVID-19, FLU A/B, RSV PCR - Swab, Nasopharynx [[id number]]  (Normal) Collected: 01/17/22 0515    Lab Status: Final result Specimen: Swab from Nasopharynx Updated: 01/17/22 0611     COVID19 Not Detected     Influenza A PCR Not Detected     Influenza B PCR Not Detected     RSV, PCR Not Detected    Narrative:      Fact sheet for providers: https://www.fda.gov/media/488346/download    Fact sheet for patients: https://www.fda.gov/media/297470/download    Test performed by PCR.    Blood Culture - Blood, Arm, Left [[id number]]  (Normal) Collected: 01/17/22 0500    Lab Status: Preliminary result Specimen: Blood from Arm, Left Updated: 01/21/22 0631     Blood Culture No growth at 4 days    Blood Culture -  Blood, Arm, Right [[id number]]  (Normal) Collected: 01/17/22 0500    Lab Status: Preliminary result Specimen: Blood from Arm, Right Updated: 01/21/22 0631     Blood Culture No growth at 4 days          Adult Transthoracic Echo Complete W/ Cont if Necessary Per Protocol    Result Date: 1/18/2022  · Left ventricular wall thickness is consistent with moderate concentric hypertrophy. · Left ventricular ejection fraction appears to be 51 - 55%. Left ventricular systolic function is normal. · Left ventricular diastolic function is consistent with (grade I) impaired relaxation. · Estimated right ventricular systolic pressure from tricuspid regurgitation is mildly elevated (35-45 mmHg).      CT Head Without Contrast    Result Date: 1/17/2022  EXAMINATION: CT HEAD WO CONTRAST-  INDICATION: AMS; J96.21-Acute and chronic respiratory failure with hypoxia; J96.22-Acute and chronic respiratory failure with hypercapnia; J44.1-Chronic obstructive pulmonary disease with (acute) exacerbation; A41.9-Sepsis, unspecified organism; J18.9-Pneumonia, unspecified organism  TECHNIQUE: CT the head without IV contrast  COMPARISON: NONE  FINDINGS:  No acute intracranial hemorrhage. No acute large territory infarct. Normal size and configuration of the ventricles. No extra-axial collections. No midline shift or herniation. Unremarkable appearance of the orbits. No acute osseous findings. Opacification of the left maxillary sinus with wall thickening, as well as opacification of the left ethmoid air cells and left frontal sinus. The mastoid air cells are clear. No acute osseous findings. Unremarkable appearance of the orbits.       No acute intracranial findings.  Left-sided predominant paranasal sinus disease.   This report was finalized on 1/17/2022 10:10 AM by Yves Wilson MD.      XR Chest 1 View    Result Date: 1/17/2022  CR Chest 1 Vw INDICATION: Shortness of air. COMPARISON:  None available. FINDINGS: Portable AP view(s) of the chest.   Mild cardiomegaly. Mediastinal contours are normal. The lungs are clear. No pneumothorax or pleural effusion.     Mild cardiomegaly. No other acute chest findings. Signer Name: Cristino Khan MD  Signed: 1/17/2022 5:08 AM  Workstation Name: [id number]  [facility]    CT Chest Pulmonary Embolism    Result Date: 1/17/2022  EXAMINATION: CT CHEST PULMONARY EMBOLISM-  INDICATION: resp fx, COPD, r/o PE; J96.21-Acute and chronic respiratory failure with hypoxia; J96.22-Acute and chronic respiratory failure with hypercapnia; J44.1-Chronic obstructive pulmonary disease with (acute) exacerbation; A41.9-Sepsis, unspecified organism; J18.9-Pneumonia, unspecified organism  TECHNIQUE: CTA of the chest (PE protocol)  COMPARISON: NONE  FINDINGS:  The exam is somewhat limited owing to respiratory motion artifact particularly at the lung bases. Normal appearance of the pulmonary arteries without evidence of pulmonary embolism. No pericardial effusion. Unremarkable appearance of the thoracic aorta. Homogeneous attenuation of the thyroid gland. No bulky or pathologic mediastinal or hilar adenopathy. Unremarkable appearance of the chest wall soft tissues. No axillary lymphadenopathy. No acute findings in the partially imaged upper abdomen; the gallbladder surgically absent. The trachea and mainstem bronchi are patent. Moderate to severe paraseptal greater than centrilobular emphysema. Dependent bibasilar consolidations with crowding of the vasculature favored reflect dependent atelectasis. No acute osseous findings. No pneumothorax.       The exam is somewhat limited owing to respiratory motion artifact particularly at the lung bases.  Within the above-stated limitation, no evidence of pulmonary embolism.  Background moderate to severe paraseptal and centrilobular emphysema.  Dependent bibasilar consolidation with vascular crowding is favored reflect dependent bibasilar atelectasis.   This report was finalized  on 1/17/2022 10:19 AM by Yves Wilson MD.          Results for orders placed during the hospital encounter of 01/17/22    Adult Transthoracic Echo Complete W/ Cont if Necessary Per Protocol    Interpretation Summary  · Left ventricular wall thickness is consistent with moderate concentric hypertrophy.  · Left ventricular ejection fraction appears to be 51 - 55%. Left ventricular systolic function is normal.  · Left ventricular diastolic function is consistent with (grade I) impaired relaxation.  · Estimated right ventricular systolic pressure from tricuspid regurgitation is mildly elevated (35-45 mmHg).      Plan for Follow-up of Pending Labs/Results:   Pending Labs     Order Current Status    Blood Culture - Blood, Arm, Left Preliminary result    Blood Culture - Blood, Arm, Right Preliminary result        Discharge Details        Discharge Medications      New Medications      Instructions Start Date   amLODIPine 10 MG tablet  Commonly known as: NORVASC   10 mg, Oral, Every 24 Hours Scheduled   Start Date: January 22, 2022     doxycycline 100 MG capsule  Commonly known as: MONODOX   100 mg, Oral, Every 12 Hours Scheduled      hydrALAZINE 10 MG tablet  Commonly known as: APRESOLINE   10 mg, Oral, Every 8 Hours Scheduled      lisinopril 40 MG tablet  Commonly known as: PRINIVIL,ZESTRIL   40 mg, Oral, Nightly      predniSONE 20 MG tablet  Commonly known as: DELTASONE   40 mg, Oral, Daily With Breakfast         Continue These Medications      Instructions Start Date   Brexpiprazole 1 MG tablet   1 mg, Oral, Daily      DULoxetine 60 MG capsule  Commonly known as: CYMBALTA   60 mg, Oral, Daily      montelukast 10 MG tablet  Commonly known as: SINGULAIR   10 mg, Oral, Nightly      triamterene-hydrochlorothiazide 37.5-25 MG per capsule  Commonly known as: DYAZIDE   1 capsule, Oral, Every Morning         Stop These Medications    amLODIPine-benazepril 5-10 MG per capsule  Commonly known as: LOTREL 5-10             Allergies   Allergen Reactions   • Penicillins Rash     Patient was told she had childhood rash to penicillin       Discharge Disposition: Home  Home or Self Care  Diet:  Hospital:  Diet Order   Procedures   • Diet Regular; Cardiac     Activity: As tolerated    Restrictions or Other Recommendations:  None       CODE STATUS:    Code Status and Medical Interventions:   Ordered at: 01/17/22 0555     Code Status (Patient has no pulse and is not breathing):    CPR (Attempt to Resuscitate)     Medical Interventions (Patient has pulse or is breathing):    Full Support       No future appointments.      Joshua Moise MD  01/21/22    Time Spent on Discharge:  I spent  35 minutes on this discharge activity which included: face-to-face encounter with the patient, reviewing the data in the system, coordination of the care with the nursing staff as well as consultants, documentation, and entering orders.

## 2022-01-19 NOTE — PROGRESS NOTES
NN spoke with pt at BS.  Pt alert and able to answer questions appropriately. Pt O2 sat 95% on  3L currently, no home O2 use. COPD action plan reviewed. Deep breathing exercises encouraged. No new concerns or questions voiced at this time.  NN will continue to follow as needed.       Per current GOLD Standards, please consider: LAMA/LABA/ICS in place ([facility]), Outpatient PFT, NRT at [place], Rehab as appropriate

## 2022-01-19 NOTE — CASE MANAGEMENT/SOCIAL WORK
"Continued Stay Note  [facility]     Patient Name: Arlene Pandey  MRN: [id number]  Today's Date: 1/18/2022    Admit Date: 1/17/2022     Discharge Plan     Row Name 01/18/22 1907       Plan    Plan Home- no ETOH resources    Plan Comments Met with this pleasant woman this afternoon.  She appears open and honest regarding his ETOH intake.  She says that she can quit drinking when she \"needs to\" has never had an withdrawal issues.  She states that she \"likes to drink, and when I watch movies with my , over a 3 hour period or so, I will have about 6 drinks\"  She does not feel that her ETOH intake has caused any negative ramifications in her life.  CIWA range while in hospital= consistently 1  BAL on admission= <10.    Nothing further to add from an addiction standpoint- thank you for the consult.    Row Name 01/18/22 3620       Plan    Plan Pt. declines resources    Plan Comments Pt. was oriented and engaged at bedside. Pt. denies AUD and reports she drinks approximately 6 drinks in six hours per day. Pt. also states that she can quit drinking without any withdrawals and always has a designated .               Discharge Codes    No documentation.                     Sarah Zhou, RN ,BSN   Addiction Coordinator     "

## 2022-01-20 PROCEDURE — 97530 THERAPEUTIC ACTIVITIES: CPT

## 2022-01-20 PROCEDURE — 63710000001 PREDNISONE PER 1 MG: Performed by: INTERNAL MEDICINE

## 2022-01-20 PROCEDURE — 25010000002 HYDRALAZINE PER 20 MG: Performed by: PHYSICIAN ASSISTANT

## 2022-01-20 PROCEDURE — 94799 UNLISTED PULMONARY SVC/PX: CPT

## 2022-01-20 PROCEDURE — 99233 SBSQ HOSP IP/OBS HIGH 50: CPT | Performed by: INTERNAL MEDICINE

## 2022-01-20 PROCEDURE — 25010000002 HEPARIN (PORCINE) PER 1000 UNITS: Performed by: NURSE PRACTITIONER

## 2022-01-20 RX ORDER — ENALAPRILAT 2.5 MG/2ML
1.25 INJECTION INTRAVENOUS EVERY 6 HOURS PRN
Status: DISCONTINUED | OUTPATIENT
Start: 2022-01-20 | End: 2022-01-21 | Stop reason: HOSPADM

## 2022-01-20 RX ORDER — HYDRALAZINE HYDROCHLORIDE 20 MG/ML
10 INJECTION INTRAMUSCULAR; INTRAVENOUS EVERY 6 HOURS PRN
Status: DISCONTINUED | OUTPATIENT
Start: 2022-01-20 | End: 2022-01-20

## 2022-01-20 RX ORDER — LISINOPRIL 20 MG/1
40 TABLET ORAL NIGHTLY
Status: DISCONTINUED | OUTPATIENT
Start: 2022-01-20 | End: 2022-01-21 | Stop reason: HOSPADM

## 2022-01-20 RX ORDER — HYDRALAZINE HYDROCHLORIDE 20 MG/ML
10 INJECTION INTRAMUSCULAR; INTRAVENOUS ONCE
Status: COMPLETED | OUTPATIENT
Start: 2022-01-20 | End: 2022-01-20

## 2022-01-20 RX ADMIN — NICARDIPINE HYDROCHLORIDE 5 MG/HR: 25 INJECTION, SOLUTION INTRAVENOUS at 22:14

## 2022-01-20 RX ADMIN — ENALAPRILAT 1.25 MG: 1.25 INJECTION INTRAVENOUS at 03:31

## 2022-01-20 RX ADMIN — SODIUM CHLORIDE, PRESERVATIVE FREE 10 ML: 5 INJECTION INTRAVENOUS at 22:16

## 2022-01-20 RX ADMIN — PREDNISONE 40 MG: 20 TABLET ORAL at 07:56

## 2022-01-20 RX ADMIN — IPRATROPIUM BROMIDE AND ALBUTEROL SULFATE 3 ML: .5; 2.5 SOLUTION RESPIRATORY (INHALATION) at 16:02

## 2022-01-20 RX ADMIN — AMLODIPINE BESYLATE 10 MG: 10 TABLET ORAL at 08:41

## 2022-01-20 RX ADMIN — DOXYCYCLINE 100 MG: 100 CAPSULE ORAL at 08:42

## 2022-01-20 RX ADMIN — THIAMINE HCL TAB 100 MG 100 MG: 100 TAB at 08:42

## 2022-01-20 RX ADMIN — IPRATROPIUM BROMIDE AND ALBUTEROL SULFATE 3 ML: .5; 2.5 SOLUTION RESPIRATORY (INHALATION) at 20:09

## 2022-01-20 RX ADMIN — HEPARIN SODIUM 5000 UNITS: 5000 INJECTION, SOLUTION INTRAVENOUS; SUBCUTANEOUS at 14:31

## 2022-01-20 RX ADMIN — LISINOPRIL 40 MG: 20 TABLET ORAL at 18:35

## 2022-01-20 RX ADMIN — IPRATROPIUM BROMIDE AND ALBUTEROL SULFATE 3 ML: .5; 2.5 SOLUTION RESPIRATORY (INHALATION) at 09:09

## 2022-01-20 RX ADMIN — NICARDIPINE HYDROCHLORIDE 5 MG/HR: 25 INJECTION, SOLUTION INTRAVENOUS at 14:04

## 2022-01-20 RX ADMIN — LABETALOL 20 MG/4 ML (5 MG/ML) INTRAVENOUS SYRINGE 10 MG: at 02:02

## 2022-01-20 RX ADMIN — BREXPIPRAZOLE 1 MG: 1 TABLET ORAL at 08:42

## 2022-01-20 RX ADMIN — MULTIVITAMIN TABLET 1 TABLET: TABLET at 08:42

## 2022-01-20 RX ADMIN — FOLIC ACID 1 MG: 1 TABLET ORAL at 08:42

## 2022-01-20 RX ADMIN — SODIUM CHLORIDE, PRESERVATIVE FREE 10 ML: 5 INJECTION INTRAVENOUS at 08:43

## 2022-01-20 RX ADMIN — BUDESONIDE 0.5 MG: 0.5 INHALANT RESPIRATORY (INHALATION) at 09:09

## 2022-01-20 RX ADMIN — HYDRALAZINE HYDROCHLORIDE 10 MG: 20 INJECTION INTRAMUSCULAR; INTRAVENOUS at 05:49

## 2022-01-20 RX ADMIN — MONTELUKAST SODIUM 10 MG: 10 TABLET, COATED ORAL at 22:15

## 2022-01-20 RX ADMIN — HEPARIN SODIUM 5000 UNITS: 5000 INJECTION, SOLUTION INTRAVENOUS; SUBCUTANEOUS at 22:15

## 2022-01-20 RX ADMIN — TRIAMTERENE AND HYDROCHLOROTHIAZIDE 1 TABLET: 37.5; 25 TABLET ORAL at 08:42

## 2022-01-20 RX ADMIN — HEPARIN SODIUM 5000 UNITS: 5000 INJECTION, SOLUTION INTRAVENOUS; SUBCUTANEOUS at 05:49

## 2022-01-20 RX ADMIN — NICARDIPINE HYDROCHLORIDE 5 MG/HR: 25 INJECTION, SOLUTION INTRAVENOUS at 07:41

## 2022-01-20 RX ADMIN — DULOXETINE 60 MG: 60 CAPSULE, DELAYED RELEASE ORAL at 09:50

## 2022-01-20 RX ADMIN — BUDESONIDE 0.5 MG: 0.5 INHALANT RESPIRATORY (INHALATION) at 20:08

## 2022-01-20 RX ADMIN — DOXYCYCLINE 100 MG: 100 CAPSULE ORAL at 22:15

## 2022-01-20 NOTE — THERAPY TREATMENT NOTE
Patient Name: Arlene Pandey  : 1960    MRN: [id number]                              Today's Date: 2022       Admit Date: 2022    Visit Dx:     ICD-10-CM ICD-9-CM   1. Acute on chronic respiratory failure with hypoxia and hypercapnia (HCC)  J96.21 518.84    J96.22 786.09     799.02   2. Acute exacerbation of chronic obstructive pulmonary disease (COPD) (HCC)  J44.1 491.21   3. Acute sepsis (HCC)  A41.9 038.9     995.91   4. Pneumonia due to infectious organism, unspecified laterality, unspecified part of lung  J18.9 486     Patient Active Problem List   Diagnosis   • COPD with acute exacerbation (HCC)   • Lactic acidosis   • Elevated troponin   • Hypokalemia   • Elevated serum creatinine   • Leukocytosis   • Hypertensive urgency   • Acute respiratory failure (HCC)   • Alcohol use   • Tobacco use   • Anxiety associated with depression   • Acute respiratory failure with hypoxia and hypercapnia (HCC)   • Sepsis (HCC)     Past Medical History:   Diagnosis Date   • Asthma    • COPD (chronic obstructive pulmonary disease) (HCC)    • ETOH abuse    • Hypertension    • Mood disorder (HCC)    • Smoker      Past Surgical History:   Procedure Laterality Date   • APPENDECTOMY     • CHOLECYSTECTOMY        General Information     Row Name 22 1010          Physical Therapy Time and Intention    Mode of Treatment physical therapy  -LF     Row Name 22 1010          General Information    Patient Profile Reviewed yes  -LF     Row Name 22 1010          Cognition    Orientation Status (Cognition) oriented x 3  -     Row Name 22 1010          Safety Issues, Functional Mobility    Impairments Affecting Function (Mobility) endurance/activity tolerance  -LF           User Key  (r) = Recorded By, (t) = Taken By, (c) = Cosigned By    Initials Name Provider Type    LF Darby Mckeon PT Physical Therapist               Mobility     Row Name 22 1010          Bed Mobility    Comment (Bed  Mobility) UIC on arrival  -LF     Row Name 01/20/22 1010          Transfers    Comment (Transfers) UIC on arrival  -LF     Row Name 01/20/22 1010          Gait/Stairs (Locomotion)    Distance in Feet (Gait) 350  -     Comment (Gait/Stairs) stopped in BR prior to ambulating in hallway.  Independent with toileting  -           User Key  (r) = Recorded By, (t) = Taken By, (c) = Cosigned By    Initials Name Provider Type     Darby Mckeon PT Physical Therapist               Obj/Interventions     [facility] Name 01/20/22 1010          Knee (Therapeutic Exercise)    Knee (Therapeutic Exercise) AROM (active range of motion)  -     Knee Strengthening (Therapeutic Exercise) LAQ (long arc quad); marching while seated  -LF     Row Name 01/20/22 1010          Ankle (Therapeutic Exercise)    Ankle (Therapeutic Exercise) AROM (active range of motion)  -     Ankle AROM (Therapeutic Exercise) plantarflexion  -LF     Row Name 01/20/22 1010          Balance    Static Sitting Balance WNL  -     Dynamic Sitting Balance WNL  -LF     Dynamic Standing Balance mild impairment; unsupported  -           User Key  (r) = Recorded By, (t) = Taken By, (c) = Cosigned By    Initials Name Provider Type     Darby Mckeon PT Physical Therapist               Goals/Plan    No documentation.                Clinical Impression     Row Name 01/20/22 1010          Pain    Additional Documentation Pain Scale: Numbers Pre/Post-Treatment (Group)  -LF     Row Name 01/20/22 1010          Pain Scale: Numbers Pre/Post-Treatment    Pretreatment Pain Rating 0/10 - no pain  -     Posttreatment Pain Rating 0/10 - no pain  -LF     Row Name 01/20/22 1010          Plan of Care Review    Plan of Care Reviewed With patient  -     Progress improving  -     Outcome Summary Increased activity with PT today.  Patient ambulated ' w/o A.D.  O2 94%.  She is SBA sit<>stand. cont. per POC  -LF     Row Name 01/20/22 1010          Vital Signs    Pre  Systolic BP Rehab 157  -LF     Pre Treatment Diastolic BP 96  -LF     Intra Treatment Diastolic   -LF     Post Treatment Diastolic BP 85  -LF     Pretreatment Heart Rate (beats/min) 86  -LF     Posttreatment Heart Rate (beats/min) 87  -LF     Pre SpO2 (%) 96  -LF     O2 Delivery Pre Treatment supplemental O2  -LF     Intra SpO2 (%) 94  -LF     O2 Delivery Intra Treatment supplemental O2  -LF     Post SpO2 (%) 96  -LF     O2 Delivery Post Treatment supplemental O2  -LF     Pre Patient Position Sitting  -LF     Intra Patient Position Standing  -LF     Post Patient Position Sitting  -LF     Row Name 01/20/22 1010          Positioning and Restraints    Pre-Treatment Position sitting in chair/recliner  -LF     Post Treatment Position chair  -LF     In Chair notified nsg; reclined; call light within reach; encouraged to call for assist  -LF           User Key  (r) = Recorded By, (t) = Taken By, (c) = Cosigned By    Initials Name Provider Type    LF Darby Mckeon PT Physical Therapist               Outcome Measures     Row Name 01/20/22 1010          How much help from another person do you currently need...    Turning from your back to your side while in flat bed without using bedrails? 4  -LF     Moving from lying on back to sitting on the side of a flat bed without bedrails? 4  -LF     Moving to and from a bed to a chair (including a wheelchair)? 4  -LF     Standing up from a chair using your arms (e.g., wheelchair, bedside chair)? 4  -LF     Climbing 3-5 steps with a railing? 3  -LF     To walk in hospital room? 3  -LF     AM-PAC 6 Clicks Score (PT) 22  -LF     Row Name 01/20/22 1010          Functional Assessment    Outcome Measure Options AM-PAC 6 Clicks Basic Mobility (PT)  -LF           User Key  (r) = Recorded By, (t) = Taken By, (c) = Cosigned By    Initials Name Provider Type    Darby Berg PT Physical Therapist                             Physical Therapy Education                 Title: PT  OT SLP Therapies (Done)     Topic: Physical Therapy (Done)     Point: Mobility training (Done)     Learning Progress Summary           Patient Acceptance, E,TB, VU by KG at 1/19/2022 1523                   Point: Home exercise program (Done)     Learning Progress Summary           Patient Acceptance, E,TB, VU by KG at 1/19/2022 1523                   Point: Body mechanics (Done)     Learning Progress Summary           Patient Acceptance, E,TB, VU by KG at 1/19/2022 1523                   Point: Precautions (Done)     Learning Progress Summary           Patient Acceptance, E,TB, VU by KG at 1/19/2022 1523                               User Key     Initials Effective Dates Name Provider Type Discipline    KG 06/16/21 -  Zahra Yee Physical Therapist PT              PT Recommendation and Plan     Plan of Care Reviewed With: patient  Progress: improving  Outcome Summary: Increased activity with PT today.  Patient ambulated ' w/o A.D.  O2 94%.  She is SBA sit<>stand. cont. per POC     Time Calculation:    PT Charges     Row Name 01/20/22 1010             Time Calculation    Start Time 1010  -LF      PT Received On 01/20/22  -LF              Timed Charges    03572 - PT Therapeutic Activity Minutes 32  -LF              Total Minutes    Timed Charges Total Minutes 32  -LF       Total Minutes 32  -LF            User Key  (r) = Recorded By, (t) = Taken By, (c) = Cosigned By    Initials Name Provider Type    LF Darby Mckeon PT Physical Therapist              Therapy Charges for Today     Code Description Service Date Service Provider Modifiers Qty    08061674304 HC PT THERAPEUTIC ACT EA 15 MIN 1/20/2022 Darby Mckeon PT GP 2          PT G-Codes  Outcome Measure Options: AM-PAC 6 Clicks Basic Mobility (PT)  AM-PAC 6 Clicks Score (PT): 22    Darby Mckeon PT  1/20/2022

## 2022-01-20 NOTE — PLAN OF CARE
Goal Outcome Evaluation:  Plan of Care Reviewed With: patient        Progress: improving  Outcome Summary: Increased activity with PT today.  Patient ambulated ' w/o A.D.  O2 94%.  She is SBA sit<>stand. cont. per POC

## 2022-01-20 NOTE — PROGRESS NOTES
NN spoke with pt at BS.  Pt alert and able to answer questions appropriately. Pt O2 sat  96% on  3 L currently, no home O2 use. COPD action plan reviewed. Deep breathing exercises encouraged. Smoking cessation encouraged.  No new concerns or questions voiced at this time.  NN will continue to follow as needed.       Per current GOLD Standards, please consider: LAMA/LABA/ICS in place ([facility]), Outpatient PFT, NRT at DC, Rehab as appropriate

## 2022-01-20 NOTE — PROGRESS NOTES
[facility] Medicine Services  PROGRESS NOTE    Patient Name: Arlene Pandey  : 1960  MRN: [id number]    Date of Admission: 2022  Primary Care Physician: Poncho Garcia MD    Subjective   Subjective     CC: Follow-up hypertensive crisis, SOA    HPI: No acute events overnight, BP remains elevated, breathing is improved    ROS:  Gen- No fevers, chills  CV- No chest pain, palpitations  Resp- No cough, dyspnea  GI- No N/V/D, abd pain    All other systems reviewed and are negative    Objective   Objective     Vital Signs:   Temp:  [98 °F (36.7 °C)-98.7 °F (37.1 °C)] 98.7 °F (37.1 °C)  Heart Rate:  [69-91] 80  Resp:  [18-20] 18  BP: (101-209)/() 188/117  Flow (L/min):  [2-3] 2     Physical Exam:  Constitutional: Chronically ill-appearing lady, in no acute distress, awake, alert, seated in chair  HENT: NCAT, mucous membranes moist  Respiratory: Diffuse coarse breath sounds, poor air movement, on 2 L NC.    Cardiovascular: RRR, no murmurs, rubs, or gallops  Gastrointestinal: Positive bowel sounds, soft, nontender, nondistended  Musculoskeletal: No bilateral ankle edema  Psychiatric: Appropriate affect, cooperative  Neurologic: Oriented x 3, nonfocal  Skin: No rashes    Results Reviewed:  LAB RESULTS:      Lab 22  0345 22  0600 22  1053 22  0441 22  0440   WBC 12.60* 15.13*  --  12.24*  --    HEMOGLOBIN 16.3* 16.5*  --  18.6*  --    HEMATOCRIT 45.8 46.4  --  53.5*  --    PLATELETS 279 273  --  335  --    NEUTROS ABS  --  13.03*  --  9.86*  --    IMMATURE GRANS (ABS)  --  0.07*  --  0.16*  --    LYMPHS ABS  --  0.96  --  1.02  --    MONOS ABS  --  0.97*  --  1.08*  --    EOS ABS  --  0.04  --  0.00  --    .5* 127.1*  --  130.5*  --    PROCALCITONIN  --   --   --  0.43*  --    LACTATE  --   --  1.9  --  2.5*         Lab 22  0600 22  1053 22  0441   SODIUM 137  --  138  --  139   POTASSIUM 3.9 4.0  2.9*  --  3.4*   CHLORIDE 91*  --  91*  --  93*   CO2 38.0*  --  37.0*  --  31.0*   ANION GAP 8.0  --  10.0  --  15.0   BUN 16  --  18  --  17   CREATININE 0.59  --  0.73  --  1.25*   GLUCOSE 122*  --  124*  --  125*   CALCIUM 8.7  --  8.1*  --  8.5*   MAGNESIUM  --   --   --   --  2.2   HEMOGLOBIN A1C  --   --   --  4.90  --          Lab 01/18/22  0600 01/17/22  0441   TOTAL PROTEIN 5.8* 6.9   ALBUMIN 2.70* 3.20*   GLOBULIN 3.1 3.7   ALT (SGPT) 16 19   AST (SGOT) 24 36*   BILIRUBIN 0.8 1.0   ALK PHOS 116 196*         Lab 01/17/22  1608 01/17/22  0808 01/17/22  0441   PROBNP  --   --  12,613.0*   TROPONIN T 0.046* 0.083* 0.032*                 Lab 01/17/22  0732   PH, ARTERIAL 7.422   PCO2, ARTERIAL 53.0*   PO2 ART 68.7*   FIO2 60   HCO3 ART 34.5*   BASE EXCESS ART 7.7*   CARBOXYHEMOGLOBIN 5.2*     Brief Urine Lab Results  (Last result in the past 365 days)      Color   Clarity   Blood   Leuk Est   Nitrite   Protein   CREAT   Urine HCG        01/17/22 1104 Yellow   Clear   Trace   Negative   Negative   30 mg/dL (1+)                 Microbiology Results Abnormal     Procedure Component Value - Date/Time    Blood Culture - Blood, Arm, Left [[id number]]  (Normal) Collected: 01/17/22 0500    Lab Status: Preliminary result Specimen: Blood from Arm, Left Updated: 01/20/22 0630     Blood Culture No growth at 3 days    Blood Culture - Blood, Arm, Right [[id number]]  (Normal) Collected: 01/17/22 0500    Lab Status: Preliminary result Specimen: Blood from Arm, Right Updated: 01/20/22 0630     Blood Culture No growth at 3 days    COVID PRE-OP / PRE-PROCEDURE SCREENING ORDER (NO ISOLATION) - Swab, Nasopharynx [[id number]]  (Normal) Collected: 01/17/22 0515    Lab Status: Final result Specimen: Swab from Nasopharynx Updated: 01/17/22 0611    Narrative:      The following orders were created for panel order COVID PRE-OP / PRE-PROCEDURE SCREENING ORDER (NO ISOLATION) - Swab, Nasopharynx.  Procedure                                Abnormality         Status                     ---------                               -----------         ------                     COVID-19, FLU A/B, RSV P...[[id number]]  Normal              Final result                 Please view results for these tests on the individual orders.    COVID-19, FLU A/B, RSV PCR - Swab, Nasopharynx [[id number]]  (Normal) Collected: 01/17/22 0515    Lab Status: Final result Specimen: Swab from Nasopharynx Updated: 01/17/22 0611     COVID19 Not Detected     Influenza A PCR Not Detected     Influenza B PCR Not Detected     RSV, PCR Not Detected    Narrative:      Fact sheet for providers: https://www.fda.gov/media/043336/download    Fact sheet for patients: https://www.fda.gov/media/874824/download    Test performed by PCR.          No radiology results from the last 24 hrs    Results for orders placed during the hospital encounter of 01/17/22    Adult Transthoracic Echo Complete W/ Cont if Necessary Per Protocol    Interpretation Summary  · Left ventricular wall thickness is consistent with moderate concentric hypertrophy.  · Left ventricular ejection fraction appears to be 51 - 55%. Left ventricular systolic function is normal.  · Left ventricular diastolic function is consistent with (grade I) impaired relaxation.  · Estimated right ventricular systolic pressure from tricuspid regurgitation is mildly elevated (35-45 mmHg).      I have reviewed the medications:  Scheduled Meds:amLODIPine, 10 mg, Oral, Q24H  Brexpiprazole, 1 mg, Oral, Daily  budesonide, 0.5 mg, Nebulization, BID - RT  doxycycline, 100 mg, Oral, Q12H  DULoxetine, 60 mg, Oral, Daily  thiamine, 100 mg, Oral, Daily   And  multivitamin, 1 tablet, Oral, Daily   And  folic acid, 1 mg, Oral, Daily  heparin (porcine), 5,000 Units, Subcutaneous, Q8H  ipratropium-albuterol, 3 mL, Nebulization, 4x Daily - RT  lisinopril, 40 mg, Oral, Nightly  montelukast, 10 mg, Oral, Nightly  pharmacy consult - MTM, , Does not apply,  Daily  predniSONE, 40 mg, Oral, Daily With Breakfast  sodium chloride, 10 mL, Intravenous, Q12H  triamterene-hydrochlorothiazide, 1 tablet, Oral, Daily      Continuous Infusions:niCARdipine, 5-15 mg/hr      PRN Meds:.•  acetaminophen  •  enalaprilat  •  ipratropium-albuterol  •  LORazepam **OR** LORazepam **OR** LORazepam **OR** LORazepam **OR** LORazepam **OR** LORazepam  •  potassium chloride  •  potassium chloride  •  sodium chloride  •  sodium chloride    Assessment/Plan   Assessment & Plan     Active Hospital Problems    Diagnosis  POA   • **COPD with acute exacerbation (HCC) [J44.1]  Yes   • Lactic acidosis [E87.2]  Yes   • Elevated troponin [R77.8]  Yes   • Hypokalemia [E87.6]  Yes   • Elevated serum creatinine [R79.89]  Yes   • Leukocytosis [D72.829]  Yes   • Hypertensive urgency [I16.0]  Yes   • Acute respiratory failure (HCC) [J96.00]  Yes   • Alcohol use [Z72.89]  Yes   • Tobacco use [Z72.0]  Yes   • Anxiety associated with depression [F41.8]  Yes   • Acute respiratory failure with hypoxia and hypercapnia (HCC) [J96.01, J96.02]  Yes   • Sepsis (HCC) [A41.9]  Yes      Resolved Hospital Problems   No resolved problems to display.        Brief Hospital Course to date:  Arlene Pandey is a 61 y.o. female with COPD/asthma, ongoing tobacco abuse, polycythemia, reported chronic hypoxic needs but not on oxygen who is brought to the hospital for evaluation of acute respiratory distress and encephalopathy responding rapidly for treatment of acute COPD/asthma     Acute on chronic hypoxic respiratory failure  Acute exacerbation of COPD/asthma  Doubt sepsis  -Unfortunately she continues to smoke  - CTA without PE, severe emphysematous changes without clear infiltrate  - Weaned off BiPAP, down to 3 L NC, she is supposed to be on oxygen, data deficit, but she is not;  discharge home with O2   -No evidence of sepsis, discontinued Zosyn, transitioned to oral doxycycline for COPD exacerbation   -s/p IV steroids transition to  "oral prednisone 40 mg to complete 5-day course  -s/p scheduled DuoNebs; Pulmicort nebulizer.     Hypertensive crisis  -s/p nitro gtt without much improvement  -BP remains elevated, will start Cardene gtt. goal SBP~150  -Continue amlodipine at increased dose of 10 mg daily, and Maxide  -Increase lisinopril to 40 mg and scheduled for the evening evening     Elevated proBNP  Elevated troponins  -S/p IV Lasix on admission  - No clinical evidence of CHF, CT imaging without evidence for decompensation/edema; with her polycythemia I suspect a degree of pulmonary hypertension  -Troponins flat, no chest pain     Polycythemia  - Per patient her doctor says they \"cannot put a name on it\" but she receives regular phlebotomy  - Unclear what full work-up she has had, clinically secondary polycythemia from longstanding tobacco abuse and hypoxic respiratory insufficiency would be the most likely etiology  -Follow-up outpatient     High risk alcohol use  -Has previously quit and relapsed  -s/p CIWA     Abnormal renal function, resolved  -No prior data, creatinine 1.25 on admission and now 0.73     S/p acute metabolic encephalopathy  -Now awake and oriented     Mood disorder, NOS  -Continue Cymbalta     Ongoing tobacco abuse  -Patient has quit multiple times in the past and relapsed, expresses acknowledgment that she needs to quit but is unsure if she can     Obesity, BMI 35.86 kg/M2  -Complicates all aspects of care    DVT prophylaxis:  Medical DVT prophylaxis orders are present.     AM-PAC 6 Clicks Score (PT): 19 (01/19/22 1052)    Disposition: TBD    CODE STATUS:   Code Status and Medical Interventions:   Ordered at: 01/17/22 0555     Code Status (Patient has no pulse and is not breathing):    CPR (Attempt to Resuscitate)     Medical Interventions (Patient has pulse or is breathing):    Full Support       Joshua Moise MD  01/20/22            "

## 2022-01-21 ENCOUNTER — READMISSION MANAGEMENT (OUTPATIENT)
Dept: CALL CENTER | Facility: HOSPITAL | Age: 62
End: 2022-01-21

## 2022-01-21 VITALS
BODY MASS INDEX: 33.57 KG/M2 | HEIGHT: 60 IN | WEIGHT: 171 LBS | SYSTOLIC BLOOD PRESSURE: 161 MMHG | OXYGEN SATURATION: 95 % | HEART RATE: 84 BPM | DIASTOLIC BLOOD PRESSURE: 94 MMHG | RESPIRATION RATE: 18 BRPM | TEMPERATURE: 98.3 F

## 2022-01-21 PROBLEM — J44.1 COPD WITH ACUTE EXACERBATION: Status: RESOLVED | Noted: 2022-01-17 | Resolved: 2022-01-21

## 2022-01-21 PROBLEM — J96.02 ACUTE RESPIRATORY FAILURE WITH HYPOXIA AND HYPERCAPNIA: Status: RESOLVED | Noted: 2022-01-17 | Resolved: 2022-01-21

## 2022-01-21 PROBLEM — R79.89 ELEVATED SERUM CREATININE: Status: RESOLVED | Noted: 2022-01-17 | Resolved: 2022-01-21

## 2022-01-21 PROBLEM — E87.20 LACTIC ACIDOSIS: Status: RESOLVED | Noted: 2022-01-17 | Resolved: 2022-01-21

## 2022-01-21 PROBLEM — I16.0 HYPERTENSIVE URGENCY: Status: RESOLVED | Noted: 2022-01-17 | Resolved: 2022-01-21

## 2022-01-21 PROBLEM — J96.01 ACUTE RESPIRATORY FAILURE WITH HYPOXIA AND HYPERCAPNIA: Status: RESOLVED | Noted: 2022-01-17 | Resolved: 2022-01-21

## 2022-01-21 PROBLEM — R77.8 ELEVATED TROPONIN: Status: RESOLVED | Noted: 2022-01-17 | Resolved: 2022-01-21

## 2022-01-21 PROBLEM — D72.829 LEUKOCYTOSIS: Status: RESOLVED | Noted: 2022-01-17 | Resolved: 2022-01-21

## 2022-01-21 PROBLEM — A41.9 SEPSIS: Status: RESOLVED | Noted: 2022-01-17 | Resolved: 2022-01-21

## 2022-01-21 PROBLEM — E87.6 HYPOKALEMIA: Status: RESOLVED | Noted: 2022-01-17 | Resolved: 2022-01-21

## 2022-01-21 PROCEDURE — 63710000001 PREDNISONE PER 1 MG: Performed by: INTERNAL MEDICINE

## 2022-01-21 PROCEDURE — 94799 UNLISTED PULMONARY SVC/PX: CPT

## 2022-01-21 PROCEDURE — 99239 HOSP IP/OBS DSCHRG MGMT >30: CPT | Performed by: INTERNAL MEDICINE

## 2022-01-21 PROCEDURE — 25010000002 HEPARIN (PORCINE) PER 1000 UNITS: Performed by: NURSE PRACTITIONER

## 2022-01-21 RX ORDER — HYDRALAZINE HYDROCHLORIDE 10 MG/1
10 TABLET, FILM COATED ORAL EVERY 8 HOURS SCHEDULED
Status: DISCONTINUED | OUTPATIENT
Start: 2022-01-21 | End: 2022-01-21 | Stop reason: HOSPADM

## 2022-01-21 RX ORDER — HYDRALAZINE HYDROCHLORIDE 10 MG/1
10 TABLET, FILM COATED ORAL EVERY 8 HOURS SCHEDULED
Status: DISCONTINUED | OUTPATIENT
Start: 2022-01-21 | End: 2022-01-21

## 2022-01-21 RX ORDER — PREDNISONE 20 MG/1
40 TABLET ORAL
Qty: 8 TABLET | Refills: 0 | Status: SHIPPED | OUTPATIENT
Start: 2022-01-21 | End: 2022-01-25

## 2022-01-21 RX ORDER — LISINOPRIL 40 MG/1
40 TABLET ORAL NIGHTLY
Qty: 30 TABLET | Refills: 0 | Status: SHIPPED | OUTPATIENT
Start: 2022-01-21 | End: 2022-02-20

## 2022-01-21 RX ORDER — HYDRALAZINE HYDROCHLORIDE 10 MG/1
10 TABLET, FILM COATED ORAL EVERY 8 HOURS SCHEDULED
Qty: 90 TABLET | Refills: 0 | Status: SHIPPED | OUTPATIENT
Start: 2022-01-21 | End: 2022-02-20

## 2022-01-21 RX ORDER — AMLODIPINE BESYLATE 10 MG/1
10 TABLET ORAL
Qty: 30 TABLET | Refills: 0 | Status: SHIPPED | OUTPATIENT
Start: 2022-01-22 | End: 2022-02-21

## 2022-01-21 RX ORDER — DOXYCYCLINE 100 MG/1
100 CAPSULE ORAL EVERY 12 HOURS SCHEDULED
Qty: 6 CAPSULE | Refills: 0 | Status: SHIPPED | OUTPATIENT
Start: 2022-01-21 | End: 2022-01-24

## 2022-01-21 RX ADMIN — HYDRALAZINE HYDROCHLORIDE 10 MG: 10 TABLET ORAL at 10:17

## 2022-01-21 RX ADMIN — BREXPIPRAZOLE 1 MG: 1 TABLET ORAL at 08:57

## 2022-01-21 RX ADMIN — IPRATROPIUM BROMIDE AND ALBUTEROL SULFATE 3 ML: .5; 2.5 SOLUTION RESPIRATORY (INHALATION) at 11:00

## 2022-01-21 RX ADMIN — IPRATROPIUM BROMIDE AND ALBUTEROL SULFATE 3 ML: .5; 2.5 SOLUTION RESPIRATORY (INHALATION) at 08:21

## 2022-01-21 RX ADMIN — TRIAMTERENE AND HYDROCHLOROTHIAZIDE 1 TABLET: 37.5; 25 TABLET ORAL at 08:58

## 2022-01-21 RX ADMIN — PREDNISONE 40 MG: 20 TABLET ORAL at 08:57

## 2022-01-21 RX ADMIN — DULOXETINE 60 MG: 60 CAPSULE, DELAYED RELEASE ORAL at 08:57

## 2022-01-21 RX ADMIN — SODIUM CHLORIDE, PRESERVATIVE FREE 10 ML: 5 INJECTION INTRAVENOUS at 08:59

## 2022-01-21 RX ADMIN — AMLODIPINE BESYLATE 10 MG: 10 TABLET ORAL at 08:57

## 2022-01-21 RX ADMIN — DOXYCYCLINE 100 MG: 100 CAPSULE ORAL at 08:57

## 2022-01-21 RX ADMIN — NICARDIPINE HYDROCHLORIDE 5 MG/HR: 25 INJECTION, SOLUTION INTRAVENOUS at 03:37

## 2022-01-21 RX ADMIN — HEPARIN SODIUM 5000 UNITS: 5000 INJECTION, SOLUTION INTRAVENOUS; SUBCUTANEOUS at 05:22

## 2022-01-21 RX ADMIN — BUDESONIDE 0.5 MG: 0.5 INHALANT RESPIRATORY (INHALATION) at 08:21

## 2022-01-21 NOTE — CASE MANAGEMENT/SOCIAL WORK
Case Management Discharge Note      Final Note: case mgt f/u. Plan is home today. Home O2 provided by [facility], she has a portable O2 tank for transport home. She will contact [facility] after she is home for set up of home O2 concentrator. No other d/c needs identified         Selected Continued Care - Discharged on 1/21/2022 Admission date: 1/17/2022 - Discharge disposition: Home or Self Care    Destination    No services have been selected for the patient.              Durable Medical Equipment Coordination complete.    Service Provider Selected Services Address Phone Fax Patient Preferred    [facility] [place]  Durable Medical Equipment [address] [id number] [phone] --          Dialysis/Infusion    No services have been selected for the patient.              Home Medical Care    No services have been selected for the patient.              Therapy    No services have been selected for the patient.              Community Resources    No services have been selected for the patient.              Community & DME    No services have been selected for the patient.                       Final Discharge Disposition Code: 01 - home or self-care

## 2022-01-21 NOTE — PROGRESS NOTES
NN spoke with pt at BS.  Pt alert and able to answer questions appropriately. Pt O2 on 2L currently, no home O2 use. COPD action plan reviewed. Deep breathing exercises encouraged. No new concerns or questions voiced at this time.  NN will continue to follow as needed.       Per current GOLD Standards, please consider: LAMA/LABA/ICS in place ([facility]), Outpatient PFT, NRT at DC, Rehab as appropriate

## 2022-01-22 LAB
BACTERIA SPEC AEROBE CULT: NORMAL
BACTERIA SPEC AEROBE CULT: NORMAL

## 2022-01-22 NOTE — OUTREACH NOTE
Prep Survey      Responses   [facility] patient discharged from? [place]   Is LACE score < 7 ? No   Emergency Room discharge w/ pulse ox? No   Eligibility Readm Mgmt   Discharge diagnosis COPD with acute exacerbation    Does the patient have one of the following disease processes/diagnoses(primary or secondary)? COPD/Pneumonia   Does the patient have Home health ordered? No   Is there a DME ordered? Yes   What DME was ordered? [facility]   Prep survey completed? Yes          Audrey Grover RN

## 2022-01-25 ENCOUNTER — READMISSION MANAGEMENT (OUTPATIENT)
Dept: CALL CENTER | Facility: HOSPITAL | Age: 62
End: 2022-01-25

## 2022-01-25 NOTE — OUTREACH NOTE
COPD/PN Week 1 Survey      Responses   [facility] patient discharged from? [place]   Does the patient have one of the following disease processes/diagnoses(primary or secondary)? COPD/Pneumonia   Was the primary reason for admission: COPD exacerbation   Week 1 attempt successful? Yes   Call start time 1205   Call end time 1216   Discharge diagnosis COPD with acute exacerbation    Is patient permission given to speak with other caregiver? Yes   List who call center can speak with Allan Pandey, spouse   Person spoke with today (if not patient) and relationship spouse   Meds reviewed with patient/caregiver? Yes   Is the patient having any side effects they believe may be caused by any medication additions or changes? Yes   Side effects comments  Sleeplessness from steroid.    Does the patient have all medications ordered at discharge? Yes   Is the patient taking all medications as directed (includes completed medication regime)? Yes   Does the patient have a primary care provider?  Yes   Does the patient have an appointment with their PCP or specialist within 7 days of discharge? Yes   Comments regarding PCP PCP Dr Garcia. Spouse reports appt this Thurs 1/27/22   Has the patient kept scheduled appointments due by today? N/A   Has home health visited the patient within 72 hours of discharge? N/A   What DME was ordered? [employer] [facility]   Has all DME been delivered? Yes   DME comments O22L   Pulse Ox monitoring Intermittent   Pulse Ox device source Patient   O2 Sat comments High 80's, low 90's. Spouse reports drops with activity   O2 Sat: education provided Sat levels,  Monitoring frequency,  When to seek care   Psychosocial issues? No   Did the patient receive a copy of their discharge instructions? Yes   Nursing interventions Reviewed instructions with patient   What is the patient's perception of their health status since discharge? Improving   If the patient is a current smoker, are they able to teach back resources  for cessation? Smoking cessation medications  [Spouse states will discuss any meds available at appt with PCP this thurs. ]   Is the patient/caregiver able to teach back the hierarchy of who to call/visit for symptoms/problems? PCP, Specialist, Home health nurse, Urgent Care, ED, 911 Yes   Is the patient able to teach back COPD zones? No   Nursing interventions Education provided on various zones   Patient reports what zone on this call? Yellow Zone   Yellow Zone Increased shortness of air,  Unable to complete daily activities   Yellow interventions Continue to use daily medications,  Use oxygen as ordered,  Use other meds such as steroids or antibiotics as ordered,  Do not smoke,  Get plenty of rest,  Call provider immediatly if symptoms do not improve   Week 1 call completed? Yes          Tarsha Moore RN

## 2022-02-01 ENCOUNTER — READMISSION MANAGEMENT (OUTPATIENT)
Dept: CALL CENTER | Facility: HOSPITAL | Age: 62
End: 2022-02-01

## 2022-02-01 NOTE — OUTREACH NOTE
COPD/PN Week 2 Survey      Responses   [facility] patient discharged from? [place]   Does the patient have one of the following disease processes/diagnoses(primary or secondary)? COPD/Pneumonia   Was the primary reason for admission: COPD exacerbation   Week 2 attempt successful? Yes   Call start time 1252   Call end time 1256   Discharge diagnosis COPD with acute exacerbation    Is patient permission given to speak with other caregiver? Yes   List who call center can speak with Allan Pandey, spouse   Person spoke with today (if not patient) and relationship patient   Meds reviewed with patient/caregiver? Yes   Does the patient have all medications ordered at discharge? Yes   Is the patient taking all medications as directed (includes completed medication regime)? Yes   Does the patient have a primary care provider?  Yes   Comments regarding PCP Patient has seen PCP since discharge.    Has the patient kept scheduled appointments due by today? Yes   Has home health visited the patient within 72 hours of discharge? N/A   What DME was ordered? [facility]   Has all DME been delivered? Yes   DME comments O22L   Pulse Ox monitoring Intermittent   Pulse Ox device source Patient   O2 Sat comments Reports sats 97-98% at rest and 95% with activity, wearing O2   O2 Sat: education provided Sat levels,  Monitoring frequency,  When to seek care   Psychosocial issues? No   Did the patient receive a copy of their discharge instructions? Yes   Nursing interventions Reviewed instructions with patient   What is the patient's perception of their health status since discharge? Improving   Nursing Interventions Nurse provided patient education   Is the patient/caregiver able to teach back the hierarchy of who to call/visit for symptoms/problems? PCP, Specialist, Home health nurse, Urgent Care, ED, 911 Yes   Is the patient able to teach back COPD zones? Yes   Patient reports what zone on this call? Green Zone   Green Zone Reports  doing well,  Breathing without shortness of breath,  Usual activity and exercise level   Green Zone interventions: Take daily medications,  Use oxygen as prescribed,  Do not smoke   Week 2 call completed? Yes          Tarsha Moore RN

## 2022-02-09 ENCOUNTER — READMISSION MANAGEMENT (OUTPATIENT)
Dept: CALL CENTER | Facility: HOSPITAL | Age: 62
End: 2022-02-09

## 2022-02-09 NOTE — OUTREACH NOTE
COPD/PN Week 3 Survey      Responses   [facility] patient discharged from? [place]   Does the patient have one of the following disease processes/diagnoses(primary or secondary)? COPD/Pneumonia   Was the primary reason for admission: COPD exacerbation   Week 3 attempt successful? Yes   Call start time 1044   Rescheduled Rescheduled-pt requested  [She request to be called tomorrow. ]   Call end time 1045   Discharge diagnosis COPD with acute exacerbation           Genet Matthews RN

## 2022-02-14 ENCOUNTER — READMISSION MANAGEMENT (OUTPATIENT)
Dept: CALL CENTER | Facility: HOSPITAL | Age: 62
End: 2022-02-14

## 2022-02-14 NOTE — OUTREACH NOTE
COPD/PN Week 3 Survey      Responses   [facility] patient discharged from? [place]   Does the patient have one of the following disease processes/diagnoses(primary or secondary)? COPD/Pneumonia   Was the primary reason for admission: COPD exacerbation   Week 3 attempt successful? Yes   Call start time 1655   Call end time 1657   Discharge diagnosis COPD with acute exacerbation    Person spoke with today (if not patient) and relationship Allan Clements reviewed with patient/caregiver? Yes   Is the patient taking all medications as directed (includes completed medication regime)? Yes   Has the patient kept scheduled appointments due by today? Yes   Pulse Ox monitoring Intermittent   O2 Sat comments unsure   What is the patient's perception of their health status since discharge? Improving   Are the patient's immunizations up to date?  Yes   If the patient is a current smoker, are they able to teach back resources for cessation? --  [Pt has not been smoking. It's been 6 days. ]   Is the patient able to teach back COPD zones? Yes   Patient reports what zone on this call? Green Zone   Green Zone Reports doing well,  Breathing without shortness of breath   Green Zone interventions: Take daily medications,  Use oxygen as prescribed,  Do not smoke   Week 3 call completed? Yes   Revoked No further contact(revokes)-requires comment   Is the patient interested in additional calls from an ambulatory ?  NOTE:  applies to high risk patients requiring additional follow-up. No   Graduated/Revoked comments  reports patient is doing well. She's not been smoking.           Collette Espinoza RN
